# Patient Record
Sex: MALE | Race: WHITE | Employment: UNEMPLOYED | URBAN - METROPOLITAN AREA
[De-identification: names, ages, dates, MRNs, and addresses within clinical notes are randomized per-mention and may not be internally consistent; named-entity substitution may affect disease eponyms.]

---

## 2020-05-09 ENCOUNTER — ANESTHESIA (OUTPATIENT)
Dept: SURGERY | Age: 55
End: 2020-05-09
Payer: COMMERCIAL

## 2020-05-09 ENCOUNTER — HOSPITAL ENCOUNTER (OUTPATIENT)
Age: 55
Setting detail: OBSERVATION
Discharge: HOME | End: 2020-05-10
Attending: EMERGENCY MEDICINE | Admitting: COLON & RECTAL SURGERY
Payer: COMMERCIAL

## 2020-05-09 ENCOUNTER — APPOINTMENT (OUTPATIENT)
Dept: CT IMAGING | Age: 55
End: 2020-05-09
Attending: EMERGENCY MEDICINE
Payer: COMMERCIAL

## 2020-05-09 ENCOUNTER — ANESTHESIA EVENT (OUTPATIENT)
Dept: SURGERY | Age: 55
End: 2020-05-09
Payer: COMMERCIAL

## 2020-05-09 DIAGNOSIS — K61.1 PERIRECTAL ABSCESS: Primary | ICD-10-CM

## 2020-05-09 DIAGNOSIS — D72.829 LEUKOCYTOSIS, UNSPECIFIED TYPE: ICD-10-CM

## 2020-05-09 LAB
ALBUMIN SERPL-MCNC: 3.6 G/DL (ref 3.5–5)
ALBUMIN/GLOB SERPL: 0.9 {RATIO} (ref 1.1–2.2)
ALP SERPL-CCNC: 57 U/L (ref 45–117)
ALT SERPL-CCNC: 52 U/L (ref 12–78)
ANION GAP SERPL CALC-SCNC: 6 MMOL/L (ref 5–15)
APPEARANCE UR: CLEAR
AST SERPL-CCNC: 24 U/L (ref 15–37)
BACTERIA URNS QL MICRO: NEGATIVE /HPF
BASOPHILS # BLD: 0 K/UL (ref 0–0.1)
BASOPHILS NFR BLD: 0 % (ref 0–1)
BILIRUB SERPL-MCNC: 1 MG/DL (ref 0.2–1)
BILIRUB UR QL: NEGATIVE
BUN SERPL-MCNC: 14 MG/DL (ref 6–20)
BUN/CREAT SERPL: 13 (ref 12–20)
CALCIUM SERPL-MCNC: 9.4 MG/DL (ref 8.5–10.1)
CHLORIDE SERPL-SCNC: 103 MMOL/L (ref 97–108)
CO2 SERPL-SCNC: 29 MMOL/L (ref 21–32)
COLOR UR: NORMAL
CREAT SERPL-MCNC: 1.07 MG/DL (ref 0.7–1.3)
DIFFERENTIAL METHOD BLD: ABNORMAL
EOSINOPHIL # BLD: 0.1 K/UL (ref 0–0.4)
EOSINOPHIL NFR BLD: 1 % (ref 0–7)
EPITH CASTS URNS QL MICRO: NORMAL /LPF
ERYTHROCYTE [DISTWIDTH] IN BLOOD BY AUTOMATED COUNT: 11.9 % (ref 11.5–14.5)
GLOBULIN SER CALC-MCNC: 3.9 G/DL (ref 2–4)
GLUCOSE SERPL-MCNC: 135 MG/DL (ref 65–100)
GLUCOSE UR STRIP.AUTO-MCNC: NEGATIVE MG/DL
HCT VFR BLD AUTO: 47.1 % (ref 36.6–50.3)
HGB BLD-MCNC: 16.4 G/DL (ref 12.1–17)
HGB UR QL STRIP: NEGATIVE
HYALINE CASTS URNS QL MICRO: NORMAL /LPF (ref 0–5)
IMM GRANULOCYTES # BLD AUTO: 0 K/UL (ref 0–0.04)
IMM GRANULOCYTES NFR BLD AUTO: 0 % (ref 0–0.5)
KETONES UR QL STRIP.AUTO: NEGATIVE MG/DL
LEUKOCYTE ESTERASE UR QL STRIP.AUTO: NEGATIVE
LYMPHOCYTES # BLD: 2.8 K/UL (ref 0.8–3.5)
LYMPHOCYTES NFR BLD: 24 % (ref 12–49)
MCH RBC QN AUTO: 30 PG (ref 26–34)
MCHC RBC AUTO-ENTMCNC: 34.8 G/DL (ref 30–36.5)
MCV RBC AUTO: 86.1 FL (ref 80–99)
MONOCYTES # BLD: 0.9 K/UL (ref 0–1)
MONOCYTES NFR BLD: 8 % (ref 5–13)
NEUTS SEG # BLD: 7.7 K/UL (ref 1.8–8)
NEUTS SEG NFR BLD: 67 % (ref 32–75)
NITRITE UR QL STRIP.AUTO: NEGATIVE
NRBC # BLD: 0 K/UL (ref 0–0.01)
NRBC BLD-RTO: 0 PER 100 WBC
PH UR STRIP: 6 [PH] (ref 5–8)
PLATELET # BLD AUTO: 201 K/UL (ref 150–400)
PMV BLD AUTO: 9.8 FL (ref 8.9–12.9)
POTASSIUM SERPL-SCNC: 3.9 MMOL/L (ref 3.5–5.1)
PROT SERPL-MCNC: 7.5 G/DL (ref 6.4–8.2)
PROT UR STRIP-MCNC: NEGATIVE MG/DL
RBC # BLD AUTO: 5.47 M/UL (ref 4.1–5.7)
RBC #/AREA URNS HPF: NORMAL /HPF (ref 0–5)
SODIUM SERPL-SCNC: 138 MMOL/L (ref 136–145)
SP GR UR REFRACTOMETRY: 1.01 (ref 1–1.03)
UA: UC IF INDICATED,UAUC: NORMAL
UROBILINOGEN UR QL STRIP.AUTO: 0.2 EU/DL (ref 0.2–1)
WBC # BLD AUTO: 11.5 K/UL (ref 4.1–11.1)
WBC URNS QL MICRO: NORMAL /HPF (ref 0–4)

## 2020-05-09 PROCEDURE — 74011000250 HC RX REV CODE- 250: Performed by: COLON & RECTAL SURGERY

## 2020-05-09 PROCEDURE — 77030040361 HC SLV COMPR DVT MDII -B: Performed by: COLON & RECTAL SURGERY

## 2020-05-09 PROCEDURE — 99284 EMERGENCY DEPT VISIT MOD MDM: CPT

## 2020-05-09 PROCEDURE — 76060000032 HC ANESTHESIA 0.5 TO 1 HR: Performed by: COLON & RECTAL SURGERY

## 2020-05-09 PROCEDURE — 99218 HC RM OBSERVATION: CPT

## 2020-05-09 PROCEDURE — 81001 URINALYSIS AUTO W/SCOPE: CPT

## 2020-05-09 PROCEDURE — 80053 COMPREHEN METABOLIC PANEL: CPT

## 2020-05-09 PROCEDURE — 96365 THER/PROPH/DIAG IV INF INIT: CPT

## 2020-05-09 PROCEDURE — 77030002888 HC SUT CHRMC J&J -A: Performed by: COLON & RECTAL SURGERY

## 2020-05-09 PROCEDURE — 74011250637 HC RX REV CODE- 250/637: Performed by: COLON & RECTAL SURGERY

## 2020-05-09 PROCEDURE — 74011000250 HC RX REV CODE- 250: Performed by: NURSE ANESTHETIST, CERTIFIED REGISTERED

## 2020-05-09 PROCEDURE — 77030008684 HC TU ET CUF COVD -B: Performed by: ANESTHESIOLOGY

## 2020-05-09 PROCEDURE — 76210000006 HC OR PH I REC 0.5 TO 1 HR: Performed by: COLON & RECTAL SURGERY

## 2020-05-09 PROCEDURE — 77030031139 HC SUT VCRL2 J&J -A: Performed by: COLON & RECTAL SURGERY

## 2020-05-09 PROCEDURE — 74011250636 HC RX REV CODE- 250/636: Performed by: NURSE ANESTHETIST, CERTIFIED REGISTERED

## 2020-05-09 PROCEDURE — 36415 COLL VENOUS BLD VENIPUNCTURE: CPT

## 2020-05-09 PROCEDURE — 77030011640 HC PAD GRND REM COVD -A: Performed by: COLON & RECTAL SURGERY

## 2020-05-09 PROCEDURE — 96375 TX/PRO/DX INJ NEW DRUG ADDON: CPT

## 2020-05-09 PROCEDURE — 74177 CT ABD & PELVIS W/CONTRAST: CPT

## 2020-05-09 PROCEDURE — 85025 COMPLETE CBC W/AUTO DIFF WBC: CPT

## 2020-05-09 PROCEDURE — 96376 TX/PRO/DX INJ SAME DRUG ADON: CPT

## 2020-05-09 PROCEDURE — 74011250636 HC RX REV CODE- 250/636: Performed by: EMERGENCY MEDICINE

## 2020-05-09 PROCEDURE — 74011250636 HC RX REV CODE- 250/636: Performed by: ANESTHESIOLOGY

## 2020-05-09 PROCEDURE — 74011636320 HC RX REV CODE- 636/320: Performed by: EMERGENCY MEDICINE

## 2020-05-09 PROCEDURE — 76010000138 HC OR TIME 0.5 TO 1 HR: Performed by: COLON & RECTAL SURGERY

## 2020-05-09 PROCEDURE — 74011250637 HC RX REV CODE- 250/637: Performed by: EMERGENCY MEDICINE

## 2020-05-09 PROCEDURE — 77030026438 HC STYL ET INTUB CARD -A: Performed by: ANESTHESIOLOGY

## 2020-05-09 RX ORDER — ONDANSETRON 2 MG/ML
4 INJECTION INTRAMUSCULAR; INTRAVENOUS
Status: COMPLETED | OUTPATIENT
Start: 2020-05-09 | End: 2020-05-09

## 2020-05-09 RX ORDER — DIPHENHYDRAMINE HYDROCHLORIDE 50 MG/ML
12.5 INJECTION, SOLUTION INTRAMUSCULAR; INTRAVENOUS AS NEEDED
Status: DISCONTINUED | OUTPATIENT
Start: 2020-05-09 | End: 2020-05-09 | Stop reason: HOSPADM

## 2020-05-09 RX ORDER — OXYCODONE HYDROCHLORIDE 5 MG/1
5 TABLET ORAL
Qty: 20 TAB | Refills: 0 | OUTPATIENT
Start: 2020-05-09 | End: 2020-05-12

## 2020-05-09 RX ORDER — SODIUM CHLORIDE 0.9 % (FLUSH) 0.9 %
5-40 SYRINGE (ML) INJECTION AS NEEDED
Status: DISCONTINUED | OUTPATIENT
Start: 2020-05-09 | End: 2020-05-09 | Stop reason: HOSPADM

## 2020-05-09 RX ORDER — IBUPROFEN 800 MG/1
800 TABLET ORAL
COMMUNITY

## 2020-05-09 RX ORDER — MORPHINE SULFATE 2 MG/ML
4 INJECTION, SOLUTION INTRAMUSCULAR; INTRAVENOUS ONCE
Status: COMPLETED | OUTPATIENT
Start: 2020-05-09 | End: 2020-05-09

## 2020-05-09 RX ORDER — METRONIDAZOLE 500 MG/100ML
500 INJECTION, SOLUTION INTRAVENOUS
Status: COMPLETED | OUTPATIENT
Start: 2020-05-09 | End: 2020-05-09

## 2020-05-09 RX ORDER — NALOXONE HYDROCHLORIDE 0.4 MG/ML
INJECTION, SOLUTION INTRAMUSCULAR; INTRAVENOUS; SUBCUTANEOUS AS NEEDED
Status: DISCONTINUED | OUTPATIENT
Start: 2020-05-09 | End: 2020-05-09 | Stop reason: HOSPADM

## 2020-05-09 RX ORDER — SODIUM CHLORIDE 0.9 % (FLUSH) 0.9 %
5-40 SYRINGE (ML) INJECTION AS NEEDED
Status: DISCONTINUED | OUTPATIENT
Start: 2020-05-09 | End: 2020-05-10 | Stop reason: HOSPADM

## 2020-05-09 RX ORDER — SODIUM CHLORIDE 0.9 % (FLUSH) 0.9 %
10 SYRINGE (ML) INJECTION
Status: COMPLETED | OUTPATIENT
Start: 2020-05-09 | End: 2020-05-09

## 2020-05-09 RX ORDER — MIDAZOLAM HYDROCHLORIDE 1 MG/ML
INJECTION, SOLUTION INTRAMUSCULAR; INTRAVENOUS AS NEEDED
Status: DISCONTINUED | OUTPATIENT
Start: 2020-05-09 | End: 2020-05-09 | Stop reason: HOSPADM

## 2020-05-09 RX ORDER — SODIUM CHLORIDE, SODIUM LACTATE, POTASSIUM CHLORIDE, CALCIUM CHLORIDE 600; 310; 30; 20 MG/100ML; MG/100ML; MG/100ML; MG/100ML
25 INJECTION, SOLUTION INTRAVENOUS CONTINUOUS
Status: DISCONTINUED | OUTPATIENT
Start: 2020-05-09 | End: 2020-05-09 | Stop reason: HOSPADM

## 2020-05-09 RX ORDER — HYDROMORPHONE HYDROCHLORIDE 1 MG/ML
0.2 INJECTION, SOLUTION INTRAMUSCULAR; INTRAVENOUS; SUBCUTANEOUS
Status: DISCONTINUED | OUTPATIENT
Start: 2020-05-09 | End: 2020-05-09 | Stop reason: HOSPADM

## 2020-05-09 RX ORDER — SUCCINYLCHOLINE CHLORIDE 20 MG/ML
INJECTION INTRAMUSCULAR; INTRAVENOUS AS NEEDED
Status: DISCONTINUED | OUTPATIENT
Start: 2020-05-09 | End: 2020-05-09 | Stop reason: HOSPADM

## 2020-05-09 RX ORDER — FENTANYL CITRATE 50 UG/ML
25 INJECTION, SOLUTION INTRAMUSCULAR; INTRAVENOUS
Status: DISCONTINUED | OUTPATIENT
Start: 2020-05-09 | End: 2020-05-09 | Stop reason: HOSPADM

## 2020-05-09 RX ORDER — SODIUM CHLORIDE 0.9 % (FLUSH) 0.9 %
5-40 SYRINGE (ML) INJECTION EVERY 8 HOURS
Status: DISCONTINUED | OUTPATIENT
Start: 2020-05-09 | End: 2020-05-09 | Stop reason: HOSPADM

## 2020-05-09 RX ORDER — OXYCODONE HYDROCHLORIDE 5 MG/1
5 TABLET ORAL
Qty: 20 TAB | Refills: 0 | Status: SHIPPED | OUTPATIENT
Start: 2020-05-09 | End: 2020-05-12

## 2020-05-09 RX ORDER — PROPOFOL 10 MG/ML
INJECTION, EMULSION INTRAVENOUS AS NEEDED
Status: DISCONTINUED | OUTPATIENT
Start: 2020-05-09 | End: 2020-05-09 | Stop reason: HOSPADM

## 2020-05-09 RX ORDER — DEXAMETHASONE SODIUM PHOSPHATE 100 MG/10ML
INJECTION INTRAMUSCULAR; INTRAVENOUS AS NEEDED
Status: DISCONTINUED | OUTPATIENT
Start: 2020-05-09 | End: 2020-05-09 | Stop reason: HOSPADM

## 2020-05-09 RX ORDER — SODIUM CHLORIDE, SODIUM LACTATE, POTASSIUM CHLORIDE, CALCIUM CHLORIDE 600; 310; 30; 20 MG/100ML; MG/100ML; MG/100ML; MG/100ML
INJECTION, SOLUTION INTRAVENOUS
Status: DISCONTINUED | OUTPATIENT
Start: 2020-05-09 | End: 2020-05-09 | Stop reason: HOSPADM

## 2020-05-09 RX ORDER — OXYCODONE HYDROCHLORIDE 5 MG/1
5 TABLET ORAL
Status: DISCONTINUED | OUTPATIENT
Start: 2020-05-09 | End: 2020-05-10 | Stop reason: HOSPADM

## 2020-05-09 RX ORDER — BUPIVACAINE HYDROCHLORIDE 2.5 MG/ML
INJECTION, SOLUTION EPIDURAL; INFILTRATION; INTRACAUDAL AS NEEDED
Status: DISCONTINUED | OUTPATIENT
Start: 2020-05-09 | End: 2020-05-09 | Stop reason: HOSPADM

## 2020-05-09 RX ORDER — ONDANSETRON 2 MG/ML
INJECTION INTRAMUSCULAR; INTRAVENOUS AS NEEDED
Status: DISCONTINUED | OUTPATIENT
Start: 2020-05-09 | End: 2020-05-09 | Stop reason: HOSPADM

## 2020-05-09 RX ORDER — ROCURONIUM BROMIDE 10 MG/ML
INJECTION, SOLUTION INTRAVENOUS AS NEEDED
Status: DISCONTINUED | OUTPATIENT
Start: 2020-05-09 | End: 2020-05-09 | Stop reason: HOSPADM

## 2020-05-09 RX ORDER — FENTANYL CITRATE 50 UG/ML
INJECTION, SOLUTION INTRAMUSCULAR; INTRAVENOUS AS NEEDED
Status: DISCONTINUED | OUTPATIENT
Start: 2020-05-09 | End: 2020-05-09 | Stop reason: HOSPADM

## 2020-05-09 RX ORDER — LIDOCAINE HYDROCHLORIDE 20 MG/ML
INJECTION, SOLUTION EPIDURAL; INFILTRATION; INTRACAUDAL; PERINEURAL AS NEEDED
Status: DISCONTINUED | OUTPATIENT
Start: 2020-05-09 | End: 2020-05-09 | Stop reason: HOSPADM

## 2020-05-09 RX ORDER — CIPROFLOXACIN 500 MG/1
500 TABLET ORAL
Status: COMPLETED | OUTPATIENT
Start: 2020-05-09 | End: 2020-05-09

## 2020-05-09 RX ORDER — SODIUM CHLORIDE 0.9 % (FLUSH) 0.9 %
5-40 SYRINGE (ML) INJECTION EVERY 8 HOURS
Status: DISCONTINUED | OUTPATIENT
Start: 2020-05-09 | End: 2020-05-10 | Stop reason: HOSPADM

## 2020-05-09 RX ORDER — DEXMEDETOMIDINE HYDROCHLORIDE 100 UG/ML
INJECTION, SOLUTION INTRAVENOUS AS NEEDED
Status: DISCONTINUED | OUTPATIENT
Start: 2020-05-09 | End: 2020-05-09 | Stop reason: HOSPADM

## 2020-05-09 RX ORDER — LIDOCAINE HYDROCHLORIDE 10 MG/ML
0.1 INJECTION, SOLUTION EPIDURAL; INFILTRATION; INTRACAUDAL; PERINEURAL AS NEEDED
Status: DISCONTINUED | OUTPATIENT
Start: 2020-05-09 | End: 2020-05-09 | Stop reason: HOSPADM

## 2020-05-09 RX ADMIN — FENTANYL CITRATE 100 MCG: 50 INJECTION INTRAMUSCULAR; INTRAVENOUS at 19:35

## 2020-05-09 RX ADMIN — MORPHINE SULFATE 4 MG: 2 INJECTION, SOLUTION INTRAMUSCULAR; INTRAVENOUS at 15:09

## 2020-05-09 RX ADMIN — NALXONE HYDROCHLORIDE 0.08 MG: 0.4 INJECTION INTRAMUSCULAR; INTRAVENOUS; SUBCUTANEOUS at 20:32

## 2020-05-09 RX ADMIN — OXYCODONE 5 MG: 5 TABLET ORAL at 23:34

## 2020-05-09 RX ADMIN — ONDANSETRON 4 MG: 2 INJECTION INTRAMUSCULAR; INTRAVENOUS at 15:07

## 2020-05-09 RX ADMIN — LIDOCAINE HYDROCHLORIDE 40 MG: 20 INJECTION, SOLUTION EPIDURAL; INFILTRATION; INTRACAUDAL; PERINEURAL at 19:37

## 2020-05-09 RX ADMIN — PROPOFOL 200 MG: 10 INJECTION, EMULSION INTRAVENOUS at 19:38

## 2020-05-09 RX ADMIN — FENTANYL CITRATE 100 MCG: 50 INJECTION INTRAMUSCULAR; INTRAVENOUS at 20:24

## 2020-05-09 RX ADMIN — ROCURONIUM BROMIDE 10 MG: 10 INJECTION INTRAVENOUS at 19:35

## 2020-05-09 RX ADMIN — Medication 10 ML: at 23:34

## 2020-05-09 RX ADMIN — Medication 10 ML: at 15:01

## 2020-05-09 RX ADMIN — SUCCINYLCHOLINE CHLORIDE 180 MG: 20 INJECTION, SOLUTION INTRAMUSCULAR; INTRAVENOUS at 19:38

## 2020-05-09 RX ADMIN — DEXAMETHASONE SODIUM PHOSPHATE 4 MG: 10 INJECTION INTRAMUSCULAR; INTRAVENOUS at 19:38

## 2020-05-09 RX ADMIN — PHENYLEPHRINE HYDROCHLORIDE 80 MCG: 10 INJECTION INTRAVENOUS at 19:54

## 2020-05-09 RX ADMIN — FENTANYL CITRATE 50 MCG: 50 INJECTION INTRAMUSCULAR; INTRAVENOUS at 20:07

## 2020-05-09 RX ADMIN — PHENYLEPHRINE HYDROCHLORIDE 80 MCG: 10 INJECTION INTRAVENOUS at 20:02

## 2020-05-09 RX ADMIN — PHENYLEPHRINE HYDROCHLORIDE 80 MCG: 10 INJECTION INTRAVENOUS at 19:52

## 2020-05-09 RX ADMIN — ONDANSETRON HYDROCHLORIDE 4 MG: 2 INJECTION, SOLUTION INTRAMUSCULAR; INTRAVENOUS at 19:38

## 2020-05-09 RX ADMIN — PHENYLEPHRINE HYDROCHLORIDE 80 MCG: 10 INJECTION INTRAVENOUS at 19:49

## 2020-05-09 RX ADMIN — MEPERIDINE HYDROCHLORIDE 12.5 MG: 25 INJECTION INTRAMUSCULAR; INTRAVENOUS; SUBCUTANEOUS at 20:52

## 2020-05-09 RX ADMIN — CIPROFLOXACIN HYDROCHLORIDE 500 MG: 500 TABLET, FILM COATED ORAL at 16:22

## 2020-05-09 RX ADMIN — MEPERIDINE HYDROCHLORIDE 12.5 MG: 25 INJECTION INTRAMUSCULAR; INTRAVENOUS; SUBCUTANEOUS at 20:46

## 2020-05-09 RX ADMIN — METRONIDAZOLE 500 MG: 500 INJECTION, SOLUTION INTRAVENOUS at 16:23

## 2020-05-09 RX ADMIN — DEXMEDETOMIDINE HYDROCHLORIDE 10 MCG: 100 INJECTION, SOLUTION, CONCENTRATE INTRAVENOUS at 19:35

## 2020-05-09 RX ADMIN — MORPHINE SULFATE 4 MG: 2 INJECTION, SOLUTION INTRAMUSCULAR; INTRAVENOUS at 16:51

## 2020-05-09 RX ADMIN — IOPAMIDOL 100 ML: 755 INJECTION, SOLUTION INTRAVENOUS at 15:01

## 2020-05-09 RX ADMIN — MIDAZOLAM HYDROCHLORIDE 2 MG: 1 INJECTION, SOLUTION INTRAMUSCULAR; INTRAVENOUS at 19:28

## 2020-05-09 RX ADMIN — SODIUM CHLORIDE, POTASSIUM CHLORIDE, SODIUM LACTATE AND CALCIUM CHLORIDE: 600; 310; 30; 20 INJECTION, SOLUTION INTRAVENOUS at 19:25

## 2020-05-09 NOTE — DISCHARGE INSTRUCTIONS
Kaylie Morris MD, FACS  Yvon Her MD, FACS  Alejandro Wadsworth. Olu Daniel MD, [address] [facility] Hari Schmitz MD, [address] [facility] Carlos Villarreal MD, FACS  Carolina Borden. MD Vasiliy Garsia MD Lendel Comber, MD    [facility] Discharge Instructions for Ambulatory 23-Hour Surgery Patients    1. Advance to high fiber diet as tolerated. 2. Take Metamucil/Citrucel 1 teaspoon mixed in a glass of water in AM and PM.  3. Remove dressing at 10 PM.  4. Take 1 sitz baths today (warm water baths for 15-20 minutes). Begin four (4) times a day the day after surgery. 5. Apply Nupercainal Ointment (does not need a prescription) to the anal area after sitz baths, place a dry 4x4 gauze over the are between the buttocks. 6. Take pain medication as prescribed. (NO DRIVING WHILE ON PAIN MEDICATION). 7. No lifting any objects weighing more than 15 pounds. 8. No sitting more than 45 minutes without standing, walking, or lying down. 9. You may walk as desired. 10.  Please schedule an appointment in the office within 10-14 days. Call ahead to schedule your appointment [phone]. 11.  Call [facility] [phone] if you have any problems or questions after   hours. 12.  Expect slight bright red blood up to four (4) weeks from surgery. 13.  Stitches are the dissolving type - they do not need removal in the office. 14.  If no bowel movement by 5/12/20, take 30cc (1 tablespoon) of Milk of Magnesia. Repeat if no results. If still no bowel movement the next day, then call the office.

## 2020-05-09 NOTE — ANESTHESIA PREPROCEDURE EVALUATION
Anesthetic History   No history of anesthetic complications            Review of Systems / Medical History  Patient summary reviewed, nursing notes reviewed and pertinent labs reviewed    Pulmonary          Smoker         Neuro/Psych         Psychiatric history (Bipolar D/O)    Comments: ADHD  Bipolar II Disorder  Depression  Sciatica, right Cardiovascular  Within defined limits                Exercise tolerance: >4 METS     GI/Hepatic/Renal           Liver disease (fatty liver; pt unaware)    Comments: Fatty Liver Endo/Other        Morbid obesity     Other Findings   Comments: Eloise-rectal abscess           Physical Exam    Airway  Mallampati: II  TM Distance: > 6 cm  Neck ROM: normal range of motion   Mouth opening: Normal    Comments: beard Cardiovascular    Rhythm: regular  Rate: normal      Pertinent negatives: No murmur   Dental    Dentition: Poor dentition  Comments: Multiple missing teeth, none loose.    Pulmonary  Breath sounds clear to auscultation               Abdominal  GI exam deferred       Other Findings            Anesthetic Plan    ASA: 2  Anesthesia type: general    Monitoring Plan: BIS      Induction: Intravenous and RSI  Anesthetic plan and risks discussed with: Patient

## 2020-05-09 NOTE — ED PROVIDER NOTES
EMERGENCY DEPARTMENT HISTORY AND PHYSICAL EXAM      Date: 5/9/2020  Patient Name: Simuel Gosselin    History of Presenting Illness     Chief Complaint   Patient presents with    Anal Pain     worsening rectal anal pain for two days; [facility]       History Provided By: Patient    HPI: Simuel Gosselin, 49 y.o. male presents to the ED with cc of rectal pain. Symptoms have been present for the past 2 to 3 days. Patient states that symptoms  have progressively worsened since onset. He denies any fevers or chills, vomiting. He does endorse abdominal pain located to suprapubic region. Denies any associated urinary symptoms. Symptoms are primarily located to rectum and he has been unable to have bowel movement since onset, he has had some liquid leakage from his rectum. He has not had any prior illnesses related to this. [facility] and found to have white count of 13,000 and sent to the ED for further evaluation. There are no other complaints, changes, or physical findings at this time. PCP: Jose Metz MD    No current facility-administered medications on file prior to encounter. Current Outpatient Medications on File Prior to Encounter   Medication Sig Dispense Refill    ibuprofen (MOTRIN) 800 mg tablet Take 800 mg by mouth every six (6) hours as needed for Pain.  clonazePAM (KLONOPIN) 0.5 mg tablet Take  by mouth as needed.  dextroamphetamine-amphetamine (ADDERALL) 20 mg tablet Take 20 mg by mouth three (3) times daily.  oxyCODONE-acetaminophen (PERCOCET) 5-325 mg per tablet Take 1 Tab by mouth every four (4) hours as needed for Pain. Max Daily Amount: 6 Tabs.  25 Tab 0       Past History     Past Medical History:  Past Medical History:   Diagnosis Date    ADHD (attention deficit hyperactivity disorder)     Bipolar 2 disorder ([facility] [address]     Fatty liver     as of 5/27/15; pt states he is unaware he had fatty    Sciatica        Past Surgical History:  Past Surgical History:   Procedure Laterality Date    HX APPENDECTOMY  age 17   [de-identified] KNEE ARTHROSCOPY Left        Family History:  History reviewed. No pertinent family history. Social History:  Social History     Tobacco Use    Smoking status: Current Some Day Smoker     Last attempt to quit: 2014     Years since quittin.2   Substance Use Topics    Alcohol use: Yes     Comment: not weekly    Drug use: No       Allergies: Allergies   Allergen Reactions    Pcn [Penicillins] Hives         Review of Systems   Review of Systems   Constitutional: Negative for chills and fever. HENT: Negative. Eyes: Negative for visual disturbance. Respiratory: Negative for cough and shortness of breath. Cardiovascular: Negative for chest pain and leg swelling. Gastrointestinal: Positive for abdominal pain, diarrhea and nausea. Negative for anal bleeding, blood in stool, constipation and vomiting. Genitourinary: Negative. Musculoskeletal: Negative for back pain and gait problem. Skin: Negative for color change and rash. Neurological: Negative for dizziness, weakness, light-headedness and headaches. Hematological: Does not bruise/bleed easily. All other systems reviewed and are negative. Physical Exam   Physical Exam  Vitals signs and nursing note reviewed. Constitutional:       General: He is in acute distress ( Appears to be in moderate distress due to pain. ). Appearance: Normal appearance. He is obese. He is not ill-appearing or toxic-appearing. HENT:      Head: Normocephalic and atraumatic. Nose: Nose normal.      Mouth/Throat:      Mouth: Mucous membranes are moist.   Eyes:      Extraocular Movements: Extraocular movements intact. Pupils: Pupils are equal, round, and reactive to light. Neck:      Musculoskeletal: Normal range of motion and neck supple. Cardiovascular:      Rate and Rhythm: Normal rate and regular rhythm. Heart sounds:  No murmur. Pulmonary:      Effort: Pulmonary effort is normal. No respiratory distress. Breath sounds: Normal breath sounds. No wheezing. Abdominal:      General: There is no distension. Palpations: Abdomen is soft. Tenderness: There is abdominal tenderness ( Mild TTP located to suprapubic region without guarding or rebound). There is no guarding or rebound. Genitourinary:     Comments: External examination demonstrates external hemorrhoids which are nonthrombosed. There is no obvious fluctuance or mass noted. Patient unable to tolerate digital rectal examination due to severe pain which significantly limits my examination, however I do not appreciate any obvious mass on my limited SILVIA  Musculoskeletal: Normal range of motion. General: No swelling or tenderness. Right lower leg: No edema. Left lower leg: No edema. Skin:     General: Skin is warm and dry. Coloration: Skin is not pale. Findings: No erythema. Neurological:      General: No focal deficit present. Mental Status: He is alert and oriented to person, place, and time. Diagnostic Study Results     Labs -     Recent Results (from the past 12 hour(s))   CBC WITH AUTOMATED DIFF    Collection Time: 05/09/20  2:35 PM   Result Value Ref Range    WBC 11.5 (H) 4.1 - 11.1 K/uL    RBC 5.47 4.10 - 5.70 M/uL    HGB 16.4 12.1 - 17.0 g/dL    HCT 47.1 36.6 - 50.3 %    MCV 86.1 80.0 - 99.0 FL    MCH 30.0 26.0 - 34.0 PG    MCHC 34.8 30.0 - 36.5 g/dL    RDW 11.9 11.5 - 14.5 %    PLATELET 289 966 - 068 K/uL    MPV 9.8 8.9 - 12.9 FL    NRBC 0.0 0  WBC    ABSOLUTE NRBC 0.00 0.00 - 0.01 K/uL    NEUTROPHILS 67 32 - 75 %    LYMPHOCYTES 24 12 - 49 %    MONOCYTES 8 5 - 13 %    EOSINOPHILS 1 0 - 7 %    BASOPHILS 0 0 - 1 %    IMMATURE GRANULOCYTES 0 0.0 - 0.5 %    ABS. NEUTROPHILS 7.7 1.8 - 8.0 K/UL    ABS. LYMPHOCYTES 2.8 0.8 - 3.5 K/UL    ABS. MONOCYTES 0.9 0.0 - 1.0 K/UL    ABS.  EOSINOPHILS 0.1 0.0 - 0.4 K/UL ABS. BASOPHILS 0.0 0.0 - 0.1 K/UL    ABS. IMM. GRANS. 0.0 0.00 - 0.04 K/UL    DF AUTOMATED     METABOLIC PANEL, COMPREHENSIVE    Collection Time: 05/09/20  2:35 PM   Result Value Ref Range    Sodium 138 136 - 145 mmol/L    Potassium 3.9 3.5 - 5.1 mmol/L    Chloride 103 97 - 108 mmol/L    CO2 29 21 - 32 mmol/L    Anion gap 6 5 - 15 mmol/L    Glucose 135 (H) 65 - 100 mg/dL    BUN 14 6 - 20 MG/DL    Creatinine 1.07 0.70 - 1.30 MG/DL    BUN/Creatinine ratio 13 12 - 20      GFR est AA >60 >60 ml/min/1.73m2    GFR est non-AA >60 >60 ml/min/1.73m2    Calcium 9.4 8.5 - 10.1 MG/DL    Bilirubin, total 1.0 0.2 - 1.0 MG/DL    ALT (SGPT) 52 12 - 78 U/L    AST (SGOT) 24 15 - 37 U/L    Alk. phosphatase 57 45 - 117 U/L    Protein, total 7.5 6.4 - 8.2 g/dL    Albumin 3.6 3.5 - 5.0 g/dL    Globulin 3.9 2.0 - 4.0 g/dL    A-G Ratio 0.9 (L) 1.1 - 2.2     URINALYSIS W/ REFLEX CULTURE    Collection Time: 05/09/20  3:36 PM   Result Value Ref Range    Color YELLOW/STRAW      Appearance CLEAR CLEAR      Specific gravity 1.010 1.003 - 1.030      pH (UA) 6.0 5.0 - 8.0      Protein Negative NEG mg/dL    Glucose Negative NEG mg/dL    Ketone Negative NEG mg/dL    Bilirubin Negative NEG      Blood Negative NEG      Urobilinogen 0.2 0.2 - 1.0 EU/dL    Nitrites Negative NEG      Leukocyte Esterase Negative NEG      UA:UC IF INDICATED CULTURE NOT INDICATED BY UA RESULT CNI      WBC 0-4 0 - 4 /hpf    RBC 0-5 0 - 5 /hpf    Epithelial cells FEW FEW /lpf    Bacteria Negative NEG /hpf    Hyaline cast 0-2 0 - 5 /lpf       Radiologic Studies -   CT ABD PELV W CONT   Final Result   IMPRESSION: Crescentic shaped fluid collection anterior and lateral to the anus,   measuring 1.7 cm x 3.2 cm in greatest dimensions. Very mild asymmetric stranding   within the fat of the inferior medial right buttock just lateral to the anus.          CT Results  (Last 48 hours)               05/09/20 1502  CT ABD PELV W CONT Final result    Impression:  IMPRESSION: Crescentic shaped fluid collection anterior and lateral to the anus,   measuring 1.7 cm x 3.2 cm in greatest dimensions. Very mild asymmetric stranding   within the fat of the inferior medial right buttock just lateral to the anus. Narrative:  INDICATION: Severe rectal pain with suprapubic pain and leukocytosis, concern   for proctitis, perirectal abscess. CT of the abdomen and pelvis is performed with 5 mm collimation. Study is   performed with 100 cc of nonionic Isovue 370. Sagittal and coronal reformatted   images were also performed. CT dose reduction was achieved with the use of the standardized protocol   tailored for this examination and automatic exposure control for dose   modulation. Direct comparison is made to prior CT dated July 2016. Findings:       Lung bases: The visualized lung bases are clear. Liver: There is diffuse fatty infiltration of the liver. Adrenals: Adrenal glands are normal.       Pancreas: The pancreas is normal.       Gallbladder: The gallbladder is normal.       Kidneys: The kidneys are normal.       Spleen: The spleen is normal.       Lymph nodes. There is no ganesh hepatitis, mesenteric, retroperitoneal or pelvic   lymphadenopathy. Bowel: No thickened or dilated loop of large or small bowel is visualized. Appendix: The appendix is surgically absent. Urinary bladder: Urinary bladder is partially filled and grossly normal.       Miscellaneous: There is a crescentic shaped fluid collection anterior and   lateral to the anus, measuring 1.7 cm x 3.2 cm in greatest dimensions. There is   very mild asymmetric stranding within the fat of the inferior medial right   buttock just lateral to the anus. There is no free intraperitoneal fluid or gas. CXR Results  (Last 48 hours)    None          Medical Decision Making   I am the first provider for this patient.     I reviewed the vital signs, available nursing notes, past medical history, past surgical history, family history and social history. Vital Signs-Reviewed the patient's vital signs. Patient Vitals for the past 12 hrs:   Temp Pulse Resp BP SpO2   05/09/20 2243 98.4 °F (36.9 °C) 95 18 124/66 97 %   05/09/20 2120 99.5 °F (37.5 °C) 100 18 137/74 95 %   05/09/20 2100  99 17 150/73 97 %   05/09/20 2058  (!) 103 18 154/79 100 %   05/09/20 2055 99.6 °F (37.6 °C) (!) 104 18 166/73 100 %   05/09/20 2050  (!) 106 22 (!) 169/94 92 %   05/09/20 2045  (!) 113 20 171/69 (!) 77 %   05/09/20 2040  (!) 109 24  100 %   05/09/20 2036 98.1 °F (36.7 °C) 95 14 141/83 99 %   05/09/20 2035  84 14  98 %   05/09/20 2030  82 15 124/74 95 %   05/09/20 2027 98.1 °F (36.7 °C) 95 10 141/83 (!) 86 %   05/09/20 1830    100/58 94 %   05/09/20 1800    129/85 96 %   05/09/20 1700    139/71 95 %   05/09/20 1600    115/66 94 %   05/09/20 1322 97.8 °F (36.6 °C) 95 20 129/80 98 %       Records Reviewed: Nursing Notes    Provider Notes (Medical Decision Making):   67-year-old male here with significant rectal pain over the past 2 to 3 days and found to have leukocytosis of 13,000 at outpatient urgent care center and sent to the ED for evaluation. On my examination he is in moderate discomfort. Rectal exam as above. He is afebrile and vital signs stable through entire ED stay. Abdomen is non-peritoneal.  Given his leukocytosis and inability to tolerate digital rectal exam, I did obtain CT imaging out of concern for proctitis versus perirectal abscess and this did demonstrate perianal versus perirectal abscess located anterior laterally to the rectum. I do not appreciate any obvious superficial mass that I would feel comfortable draining at bedside, so I discussed with colorectal surgery, Dr. Kady Means, who will perform incision and drainage under general anesthesia. Patient will be treated with Cipro and Flagyl pending OR. ED Course:   Initial assessment performed.  The patients presenting problems have been discussed, and they are in agreement with the care plan formulated and outlined with them. I have encouraged them to ask questions as they arise throughout their visit. Admission Note:  Patient is being admitted to the hospital by Dr. Omega Stuart, Service: Colorectal Surgery. The results of their tests and reasons for their admission have been discussed with them and available family. They convey agreement and understanding for the need to be admitted and for their admission diagnosis. Disposition:  Admit colorectal surgery after OR    Diagnosis     Clinical Impression:   1. Perirectal abscess    2. Leukocytosis, unspecified type        Attestations:    Shivam Moran MD    Please note that this dictation was completed with [employer], the computer voice recognition software. Quite often unanticipated grammatical, syntax, homophones, and other interpretive errors are inadvertently transcribed by the computer software. Please disregard these errors. Please excuse any errors that have escaped final proofreading. Thank you.

## 2020-05-09 NOTE — ED NOTES
Report given to Eb Anne RN. All questions welcomed and answered. Relinquished care of pt at this time.

## 2020-05-09 NOTE — H&P
[facility] and P      Date of Admission:2020    Subjective:     Chief Complaint  perirectal abscess    History of Present Illness  Pt with perianal pain over the last few days - pain has gotten progressively worse. Slight leukocytosis in the ER - CT scan shows perirectal abscess    Past Medical History:   Diagnosis Date    ADHD (attention deficit hyperactivity disorder)     Bipolar 2 disorder ([facility] [address]     Fatty liver     as of 5/27/15; pt states he is unaware he had fatty    Sciatica      Past Surgical History:   Procedure Laterality Date    HX APPENDECTOMY  age 17   [de-identified] KNEE ARTHROSCOPY Left      Allergies   Allergen Reactions    Pcn [Penicillins] Hives     History reviewed. No pertinent family history.   Social History     Socioeconomic History    Marital status:      Spouse name: Not on file    Number of children: Not on file    Years of education: Not on file    Highest education level: Not on file   Tobacco Use    Smoking status: Current Some Day Smoker     Last attempt to quit: 2014     Years since quittin.2   Substance and Sexual Activity    Alcohol use: Yes     Comment: not weekly    Drug use: No       Review of Systems    10 point ros negative except as stated above in HPI    Physical Exam   Visit Vitals  /58   Pulse 95   Temp 97.8 °F (36.6 °C)   Resp 20   Ht 6' (1.829 m)   Wt 132.6 kg (292 lb 5.3 oz)   SpO2 94%   BMI 39.65 kg/m²       General Appearance - alert and oriented x 3, in no acute distress  Trachea midline  Lungs - clear to auscultation b/l   Cardiovascular - regular rate and rhythm  Abdomen - soft, ND, NT  Extremities - nml, atraumatic, no edema   Anorectal exam - fullness and pain on palpation anterior anal verge    Radiology Results  Pertinent images reviewed    Assessment/Plan  48 yo M with perirectal abscess - to OR for incision and drainage    Matias Shahid MD  [facility]  [phone]      [place], [address], [address]    [address] [facility] [place], [place], [address]    [address]   [place], [facility]

## 2020-05-10 VITALS
RESPIRATION RATE: 18 BRPM | DIASTOLIC BLOOD PRESSURE: 56 MMHG | HEART RATE: 73 BPM | OXYGEN SATURATION: 96 % | SYSTOLIC BLOOD PRESSURE: 100 MMHG | WEIGHT: 292.33 LBS | TEMPERATURE: 98.4 F | HEIGHT: 72 IN | BODY MASS INDEX: 39.6 KG/M2

## 2020-05-10 PROCEDURE — 74011250637 HC RX REV CODE- 250/637: Performed by: COLON & RECTAL SURGERY

## 2020-05-10 PROCEDURE — 99218 HC RM OBSERVATION: CPT

## 2020-05-10 RX ADMIN — OXYCODONE 5 MG: 5 TABLET ORAL at 04:09

## 2020-05-10 RX ADMIN — Medication 1 AMPULE: at 09:41

## 2020-05-10 RX ADMIN — Medication 10 ML: at 06:47

## 2020-05-10 RX ADMIN — OXYCODONE 5 MG: 5 TABLET ORAL at 09:40

## 2020-05-10 NOTE — PROGRESS NOTES
Pt has discharge orders for today but was also placed NPO at midnight. Dr. Liz Cardona paged to inquire about wound care and diet orders. Per Telephone order given by Dr. Liz Cardona, pt placed on regular diet and physician will round on pt in an hour to assess/provide wound care.

## 2020-05-10 NOTE — BRIEF OP NOTE
Brief Postoperative Note    Patient: Seema Wilson  YOB: 1965  MRN: [id number]    Date of Procedure: 5/9/2020     Pre-Op Diagnosis: reyna-rectal abcess     Post-Op Diagnosis: Same as preoperative diagnosis.       Procedure(s):  PERIRECTAL ABSCESS EXCISION and placement of draining seton    Surgeon(s):  Orquidea Araujo MD    Surgical Assistant: None    Anesthesia: General     Estimated Blood Loss (mL): Minimal    Complications: None    Specimens: * No specimens in log *     Implants: * No implants in log *    Drains: * No LDAs found *    Findings: anterior abscess and associated fistula    Electronically Signed by Gurvinder Cameron MD on 5/9/2020 at 8:14 PM

## 2020-05-10 NOTE — PROGRESS NOTES
Problem: Falls - Risk of  Goal: *Absence of Falls  Description: Document Yonas Sol Fall Risk and appropriate interventions in the flowsheet. Outcome: Progressing Towards Goal  Note: Fall Risk Interventions:            Medication Interventions: Patient to call before getting OOB                   Problem: Patient Education: Go to Patient Education Activity  Goal: Patient/Family Education  Outcome: Progressing Towards Goal     Problem: Pain  Goal: *Control of Pain  Outcome: Progressing Towards Goal     Problem: Patient Education: Go to Patient Education Activity  Goal: Patient/Family Education  Outcome: Progressing Towards Goal     Problem: Patient Education: Go to Patient Education Activity  Goal: Patient/Family Education  Outcome: Progressing Towards Goal     Problem: Impaired Skin Integrity/Pressure Injury Treatment  Goal: *Improvement of Existing Pressure Injury  Outcome: Progressing Towards Goal  Goal: *Prevention of pressure injury  Description: Document Burke Scale and appropriate interventions in the flowsheet. Outcome: Progressing Towards Goal  Note: Pressure Injury Interventions:             Activity Interventions: Chair cushion                               Problem: Patient Education: Go to Patient Education Activity  Goal: Patient/Family Education  Outcome: Progressing Towards Goal

## 2020-05-10 NOTE — ANESTHESIA POSTPROCEDURE EVALUATION
Procedure(s):  PERIRECTAL ABSCESS EXCISION. general    Anesthesia Post Evaluation        Patient location during evaluation: PACU  Note status: Adequate. Level of consciousness: responsive to verbal stimuli and sleepy but conscious  Pain management: satisfactory to patient  Airway patency: patent  Anesthetic complications: no  Cardiovascular status: acceptable  Respiratory status: acceptable  Hydration status: acceptable  Comments: +Post-Anesthesia Evaluation and Assessment    Patient: Juan Valdez MRN: [id number]  SSN: [id number]   YOB: 1965  Age: 49 y.o. Sex: male      Cardiovascular Function/Vital Signs    /73   Pulse (!) 104   Temp 37.6 °C (99.6 °F)   Resp 18   Ht 6' (1.829 m)   Wt 132.6 kg (292 lb 5.3 oz)   SpO2 100%   BMI 39.65 kg/m²     Patient is status post Procedure(s):  PERIRECTAL ABSCESS EXCISION. Nausea/Vomiting: Controlled. Postoperative hydration reviewed and adequate. Pain:  Pain Scale 1: Numeric (0 - 10) (05/09/20 2105)  Pain Intensity 1: 0 (05/09/20 2105)   Managed. Neurological Status:   Neuro (WDL): Within Defined Limits (05/09/20 2105)   At baseline. Mental Status and Level of Consciousness: Arousable. Pulmonary Status:   O2 Device: Room air (05/09/20 2105)   Adequate oxygenation and airway patent. Complications related to anesthesia: None    Post-anesthesia assessment completed. No concerns. Signed By: Rossi Gar MD    5/9/2020  Post anesthesia nausea and vomiting:  controlled      Vitals Value Taken Time   /73 5/9/2020  9:00 PM   Temp 37.6 °C (99.6 °F) 5/9/2020  8:55 PM   Pulse 99 5/9/2020  9:08 PM   Resp 19 5/9/2020  9:08 PM   SpO2 97 % 5/9/2020  9:08 PM   Vitals shown include unvalidated device data.

## 2020-05-10 NOTE — ROUTINE PROCESS
Pain Prescription given. Discharge medications reviewed with patient and appropriate educational materials and side effects teaching were provided. IV removed and catheter intact. Wound care was completed as well as wound care instructions provided by physician during rounds before discharge. Patient escorted to main entrance by unit secretary for discharge. All personal belongings with patient.

## 2020-05-10 NOTE — PROGRESS NOTES
Shift report given to [facility], RN. Patient is post op with no wound care orders but a discharge order written. He has been medicated for pain 2x. IV locked. He initially had an order for regular diet along with his original NPO order. Patient consumed 2 lean cuisine meals, 20 packs of cracker, 6 apple juices, and water. Patient states he is still hungry. He ambulated well to the bathroom and is urinating with no difficulty.     Namita Ward RN

## 2020-05-13 NOTE — OP NOTES
Critical access hospital  OPERATIVE REPORT    Name:  Jaison Avila  MR#:  [id number]  :  1965  ACCOUNT #:  [de-identified]  DATE OF SERVICE:  2020      PREOPERATIVE DIAGNOSIS:  Perirectal abscess. POSTOPERATIVE DIAGNOSIS:  Perirectal abscess. PROCEDURE PERFORMED:  Incision and drainage of perianal abscess and placement of a draining seton at fistula tract. SURGEON:  Piedad Sahni MD    ASSISTANT:  None. ANESTHESIA:  General anesthesia. COMPLICATIONS:  none    SPECIMENS REMOVED:  None. IMPLANTS:  None. ESTIMATED BLOOD LOSS:  5 mL. URINE OUTPUT:  None. FLUIDS:  800 mL of crystalloid. DRAINS:  None. INDICATIONS FOR PROCEDURE:  The patient is a 81-year-old male who presented to the emergency room with 1 week history of perianal pain. On CT scan, he was noted to have anterior anorectal abscess. He was consented to undergo incision and drainage of perianal abscess. The risk of the procedures including bleeding, need for additional procedures were discussed with the patient, he agreed to proceed. FINDINGS:  There was an anterior abscess with an associated transsphincteric fistula tract. A seton was placed through the fistula tract. DESCRIPTION OF PROCEDURE:  The patient was brought to the operating room, placed in the jackknife prone position on the operating room table. A time-out was performed and the side, site, and identity of the patient were verified. The perineum was prepped and draped in standard sterile fashion. The buttocks were tapped apart. A perianal block using 0.25% Marcaine with epinephrine was administered. The patient was noted to have fullness and fluctuance along the anterior anal verge. Cruciate incision was made overlying this area and there was return of approximately 10-15 mL of thick purulent fluid. This cavity was probed and noted to have an internal opening with a transsphincteric fistula tract.   A silk suture was tied to the S probe and pulled through the tract. A blue vessel loop was tied into place in the tract to act as a seton. Both the internal and external openings of the fistula tract were packed with gauze. The patient tolerated the procedure well and was taken to recovery room in stable condition. Needle, sponge, and instrument counts were correct at the end of the case.         Candido James MD      [id number]  D:  05/13/2020 13:11  T:  05/13/2020 15:29  JOB #:  [id number]  CC:  Jessica Kaur MD

## 2022-03-18 PROBLEM — K61.1 PERIRECTAL ABSCESS: Status: ACTIVE | Noted: 2020-05-09

## 2023-05-09 ENCOUNTER — HOSPITAL ENCOUNTER (EMERGENCY)
Facility: HOSPITAL | Age: 58
Discharge: HOME | End: 2023-05-10
Attending: EMERGENCY MEDICINE
Payer: COMMERCIAL

## 2023-05-09 VITALS
SYSTOLIC BLOOD PRESSURE: 130 MMHG | RESPIRATION RATE: 22 BRPM | DIASTOLIC BLOOD PRESSURE: 79 MMHG | BODY MASS INDEX: 38.19 KG/M2 | TEMPERATURE: 97.7 F | OXYGEN SATURATION: 91 % | WEIGHT: 282 LBS | HEIGHT: 72 IN | HEART RATE: 84 BPM

## 2023-05-09 DIAGNOSIS — R10.11 ABDOMINAL PAIN, RIGHT UPPER QUADRANT: Primary | ICD-10-CM

## 2023-05-09 DIAGNOSIS — K76.0 HEPATIC STEATOSIS: ICD-10-CM

## 2023-05-09 DIAGNOSIS — R74.8 ELEVATED LIPASE: ICD-10-CM

## 2023-05-09 LAB
ALBUMIN SERPL-MCNC: 3.5 G/DL (ref 3.5–5)
ALBUMIN/GLOB SERPL: 0.9 (ref 1.1–2.2)
ALP SERPL-CCNC: 54 U/L (ref 45–117)
ALT SERPL-CCNC: 66 U/L (ref 12–78)
ANION GAP SERPL CALC-SCNC: 3 MMOL/L (ref 5–15)
AST SERPL-CCNC: 42 U/L (ref 15–37)
BASOPHILS # BLD: 0.1 K/UL (ref 0–0.1)
BASOPHILS NFR BLD: 1 % (ref 0–1)
BILIRUB SERPL-MCNC: 0.5 MG/DL (ref 0.2–1)
BUN SERPL-MCNC: 22 MG/DL (ref 6–20)
BUN/CREAT SERPL: 20 (ref 12–20)
CALCIUM SERPL-MCNC: 8.7 MG/DL (ref 8.5–10.1)
CHLORIDE SERPL-SCNC: 111 MMOL/L (ref 97–108)
CO2 SERPL-SCNC: 25 MMOL/L (ref 21–32)
COMMENT:: NORMAL
CREAT SERPL-MCNC: 1.09 MG/DL (ref 0.7–1.3)
DIFFERENTIAL METHOD BLD: ABNORMAL
EOSINOPHIL # BLD: 0.3 K/UL (ref 0–0.4)
EOSINOPHIL NFR BLD: 4 % (ref 0–7)
ERYTHROCYTE [DISTWIDTH] IN BLOOD BY AUTOMATED COUNT: 12.2 % (ref 11.5–14.5)
GLOBULIN SER CALC-MCNC: 3.8 G/DL (ref 2–4)
GLUCOSE SERPL-MCNC: 116 MG/DL (ref 65–100)
HCT VFR BLD AUTO: 47.2 % (ref 36.6–50.3)
HGB BLD-MCNC: 16.3 G/DL (ref 12.1–17)
IMM GRANULOCYTES # BLD AUTO: 0 K/UL (ref 0–0.04)
IMM GRANULOCYTES NFR BLD AUTO: 0 % (ref 0–0.5)
LIPASE SERPL-CCNC: 721 U/L (ref 73–393)
LYMPHOCYTES # BLD: 3.6 K/UL (ref 0.8–3.5)
LYMPHOCYTES NFR BLD: 44 % (ref 12–49)
MCH RBC QN AUTO: 29.9 PG (ref 26–34)
MCHC RBC AUTO-ENTMCNC: 34.5 G/DL (ref 30–36.5)
MCV RBC AUTO: 86.6 FL (ref 80–99)
MONOCYTES # BLD: 0.7 K/UL (ref 0–1)
MONOCYTES NFR BLD: 8 % (ref 5–13)
NEUTS SEG # BLD: 3.5 K/UL (ref 1.8–8)
NEUTS SEG NFR BLD: 43 % (ref 32–75)
NRBC # BLD: 0 K/UL (ref 0–0.01)
NRBC BLD-RTO: 0 PER 100 WBC
PLATELET # BLD AUTO: 253 K/UL (ref 150–400)
PMV BLD AUTO: 9.3 FL (ref 8.9–12.9)
POTASSIUM SERPL-SCNC: 3.9 MMOL/L (ref 3.5–5.1)
PROT SERPL-MCNC: 7.3 G/DL (ref 6.4–8.2)
RBC # BLD AUTO: 5.45 M/UL (ref 4.1–5.7)
SODIUM SERPL-SCNC: 139 MMOL/L (ref 136–145)
SPECIMEN HOLD: NORMAL
WBC # BLD AUTO: 8.1 K/UL (ref 4.1–11.1)

## 2023-05-09 PROCEDURE — 81001 URINALYSIS AUTO W/SCOPE: CPT

## 2023-05-09 PROCEDURE — 6360000002 HC RX W HCPCS: Performed by: EMERGENCY MEDICINE

## 2023-05-09 PROCEDURE — 85025 COMPLETE CBC W/AUTO DIFF WBC: CPT

## 2023-05-09 PROCEDURE — 80053 COMPREHEN METABOLIC PANEL: CPT

## 2023-05-09 PROCEDURE — 83690 ASSAY OF LIPASE: CPT

## 2023-05-09 PROCEDURE — 99285 EMERGENCY DEPT VISIT HI MDM: CPT

## 2023-05-09 PROCEDURE — 36415 COLL VENOUS BLD VENIPUNCTURE: CPT

## 2023-05-09 PROCEDURE — 6370000000 HC RX 637 (ALT 250 FOR IP): Performed by: EMERGENCY MEDICINE

## 2023-05-09 PROCEDURE — 81002 URINALYSIS NONAUTO W/O SCOPE: CPT

## 2023-05-09 PROCEDURE — 96374 THER/PROPH/DIAG INJ IV PUSH: CPT

## 2023-05-09 PROCEDURE — 2580000003 HC RX 258: Performed by: EMERGENCY MEDICINE

## 2023-05-09 RX ORDER — 0.9 % SODIUM CHLORIDE 0.9 %
1000 INTRAVENOUS SOLUTION INTRAVENOUS ONCE
Status: COMPLETED | OUTPATIENT
Start: 2023-05-09 | End: 2023-05-10

## 2023-05-09 RX ORDER — KETOROLAC TROMETHAMINE 30 MG/ML
15 INJECTION, SOLUTION INTRAMUSCULAR; INTRAVENOUS
Status: COMPLETED | OUTPATIENT
Start: 2023-05-09 | End: 2023-05-09

## 2023-05-09 RX ORDER — DICYCLOMINE HYDROCHLORIDE 10 MG/1
10 CAPSULE ORAL
Qty: 20 CAPSULE | Refills: 0 | Status: SHIPPED | OUTPATIENT
Start: 2023-05-09 | End: 2023-05-10 | Stop reason: SDUPTHER

## 2023-05-09 RX ORDER — ONDANSETRON 2 MG/ML
4 INJECTION INTRAMUSCULAR; INTRAVENOUS EVERY 6 HOURS PRN
Status: DISCONTINUED | OUTPATIENT
Start: 2023-05-09 | End: 2023-05-10

## 2023-05-09 RX ORDER — DICYCLOMINE HCL 20 MG
20 TABLET ORAL
Status: DISCONTINUED | OUTPATIENT
Start: 2023-05-09 | End: 2023-05-10

## 2023-05-09 RX ADMIN — SODIUM CHLORIDE 1000 ML: 9 INJECTION, SOLUTION INTRAVENOUS at 23:06

## 2023-05-09 RX ADMIN — DICYCLOMINE HYDROCHLORIDE 20 MG: 20 TABLET ORAL at 23:12

## 2023-05-09 RX ADMIN — KETOROLAC TROMETHAMINE 15 MG: 30 INJECTION, SOLUTION INTRAMUSCULAR; INTRAVENOUS at 23:07

## 2023-05-09 ASSESSMENT — PAIN SCALES - GENERAL: PAINLEVEL_OUTOF10: 8

## 2023-05-09 ASSESSMENT — PAIN DESCRIPTION - DESCRIPTORS: DESCRIPTORS: SHARP

## 2023-05-09 ASSESSMENT — LIFESTYLE VARIABLES
HOW MANY STANDARD DRINKS CONTAINING ALCOHOL DO YOU HAVE ON A TYPICAL DAY: PATIENT DOES NOT DRINK
HOW OFTEN DO YOU HAVE A DRINK CONTAINING ALCOHOL: MONTHLY OR LESS

## 2023-05-09 ASSESSMENT — PAIN DESCRIPTION - LOCATION: LOCATION: ABDOMEN

## 2023-05-10 ENCOUNTER — APPOINTMENT (OUTPATIENT)
Facility: HOSPITAL | Age: 58
End: 2023-05-10
Payer: COMMERCIAL

## 2023-05-10 LAB
APPEARANCE UR: CLEAR
BACTERIA URNS QL MICRO: NEGATIVE /HPF
BILIRUB UR QL: NEGATIVE
COLOR UR: ABNORMAL
EKG ATRIAL RATE: 74 BPM
EKG DIAGNOSIS: NORMAL
EKG P AXIS: 55 DEGREES
EKG P-R INTERVAL: 162 MS
EKG Q-T INTERVAL: 360 MS
EKG QRS DURATION: 74 MS
EKG QTC CALCULATION (BAZETT): 399 MS
EKG R AXIS: 56 DEGREES
EKG T AXIS: 38 DEGREES
EKG VENTRICULAR RATE: 74 BPM
EPITH CASTS URNS QL MICRO: ABNORMAL /LPF
GLUCOSE UR STRIP.AUTO-MCNC: NEGATIVE MG/DL
HGB UR QL STRIP: NEGATIVE
HYALINE CASTS URNS QL MICRO: ABNORMAL /LPF (ref 0–2)
KETONES UR QL STRIP.AUTO: NEGATIVE MG/DL
LEUKOCYTE ESTERASE UR QL STRIP.AUTO: ABNORMAL
NITRITE UR QL STRIP.AUTO: NEGATIVE
PH UR STRIP: 6.5 (ref 5–8)
PROT UR STRIP-MCNC: NEGATIVE MG/DL
RBC #/AREA URNS HPF: ABNORMAL /HPF (ref 0–5)
SP GR UR REFRACTOMETRY: 1.02
UROBILINOGEN UR QL STRIP.AUTO: 1 EU/DL (ref 0.2–1)
WBC URNS QL MICRO: ABNORMAL /HPF (ref 0–4)

## 2023-05-10 PROCEDURE — 96374 THER/PROPH/DIAG INJ IV PUSH: CPT

## 2023-05-10 PROCEDURE — 76705 ECHO EXAM OF ABDOMEN: CPT

## 2023-05-10 PROCEDURE — 74177 CT ABD & PELVIS W/CONTRAST: CPT

## 2023-05-10 PROCEDURE — 6360000002 HC RX W HCPCS: Performed by: EMERGENCY MEDICINE

## 2023-05-10 PROCEDURE — 6360000004 HC RX CONTRAST MEDICATION: Performed by: EMERGENCY MEDICINE

## 2023-05-10 PROCEDURE — 6370000000 HC RX 637 (ALT 250 FOR IP): Performed by: EMERGENCY MEDICINE

## 2023-05-10 RX ORDER — CLONAZEPAM 0.5 MG/1
TABLET ORAL PRN
COMMUNITY

## 2023-05-10 RX ORDER — ONDANSETRON 2 MG/ML
4 INJECTION INTRAMUSCULAR; INTRAVENOUS
Status: COMPLETED | OUTPATIENT
Start: 2023-05-10 | End: 2023-05-10

## 2023-05-10 RX ORDER — DICYCLOMINE HCL 20 MG
20 TABLET ORAL
Status: COMPLETED | OUTPATIENT
Start: 2023-05-10 | End: 2023-05-10

## 2023-05-10 RX ORDER — PANTOPRAZOLE SODIUM 40 MG/1
40 TABLET, DELAYED RELEASE ORAL
Qty: 30 TABLET | Refills: 0 | Status: SHIPPED | OUTPATIENT
Start: 2023-05-10

## 2023-05-10 RX ORDER — ONDANSETRON 4 MG/1
4 TABLET, FILM COATED ORAL EVERY 8 HOURS PRN
Qty: 15 TABLET | Refills: 0 | Status: SHIPPED | OUTPATIENT
Start: 2023-05-10

## 2023-05-10 RX ORDER — IBUPROFEN 800 MG/1
TABLET ORAL EVERY 6 HOURS PRN
COMMUNITY

## 2023-05-10 RX ORDER — DEXTROAMPHETAMINE SACCHARATE, AMPHETAMINE ASPARTATE, DEXTROAMPHETAMINE SULFATE AND AMPHETAMINE SULFATE 5; 5; 5; 5 MG/1; MG/1; MG/1; MG/1
TABLET ORAL 3 TIMES DAILY
COMMUNITY

## 2023-05-10 RX ORDER — DICYCLOMINE HYDROCHLORIDE 10 MG/1
10 CAPSULE ORAL
Qty: 20 CAPSULE | Refills: 0 | Status: SHIPPED | OUTPATIENT
Start: 2023-05-10

## 2023-05-10 RX ADMIN — DICYCLOMINE HYDROCHLORIDE 20 MG: 20 TABLET ORAL at 04:11

## 2023-05-10 RX ADMIN — IOPAMIDOL 100 ML: 755 INJECTION, SOLUTION INTRAVENOUS at 02:21

## 2023-05-10 RX ADMIN — ONDANSETRON 4 MG: 2 INJECTION INTRAMUSCULAR; INTRAVENOUS at 04:11

## 2023-05-10 ASSESSMENT — PAIN DESCRIPTION - DESCRIPTORS: DESCRIPTORS: DISCOMFORT

## 2023-05-10 ASSESSMENT — PAIN - FUNCTIONAL ASSESSMENT: PAIN_FUNCTIONAL_ASSESSMENT: ACTIVITIES ARE NOT PREVENTED

## 2023-05-10 ASSESSMENT — PAIN SCALES - GENERAL: PAINLEVEL_OUTOF10: 5

## 2023-05-10 ASSESSMENT — PAIN DESCRIPTION - ORIENTATION: ORIENTATION: UPPER

## 2023-05-10 ASSESSMENT — PAIN DESCRIPTION - LOCATION: LOCATION: ABDOMEN

## 2023-05-11 NOTE — ED PROVIDER NOTES
[facility] EMERGENCY DEPT  EMERGENCY DEPARTMENT ENCOUNTER       Pt Name: Connor Paniagua  MRN: [id number]  [place] 1965  Date of evaluation: 5/9/2023  Provider: Dori Cam MD   PCP: Shelia Church MD  Note Started: 11:11 PM 5/10/23     CHIEF COMPLAINT       Chief Complaint   Patient presents with    Abdominal Pain     Pt arrives via EMS from [facility] with RUQ abdominal pain that began yesterday , worsens when eating. Pain worsening . HISTORY OF PRESENT ILLNESS: 1 or more elements      History From: Patient  None     Connor Paniagua is a 65 y.o. male with history of ADD, bipolar, fatty liver disease, enlarged prostate, obesity presents to the ED with right upper quadrant pain that started yesterday. It was mild initially, but this morning it was much worse. States that he has been having it intermittently throughout the day, but it is becoming very frequent. His pain is located in his right upper quadrant and it is a sharp crampy type pain. It is associated with nausea but no vomiting. Denies any fevers or chills. He had diarrhea yesterday but that has resolved. States his pain comes on and is extremely strong for about a minute and then goes away. Pain takes his breath away. He states it is like contractions. He has been taking ibuprofen and Tylenol without relief. States he ate at about 4 PM and pain became much more frequent around 5 PM.  Past surgical history significant for appendectomy but no other abdominal surgeries. REVIEW OF SYSTEMS      Review of Systems     Positives and Pertinent negatives as per HPI.     PAST HISTORY     Past Medical History:  Past Medical History:   Diagnosis Date    ADHD (attention deficit hyperactivity disorder)     Bipolar 2 disorder (Perri Bile)     Fatty liver     as of 5/27/15; pt states he is unaware he had fatty    Sciatica          Past Surgical History:  Past Surgical History:   Procedure Laterality Date    APPENDECTOMY  age 17    KNEE ARTHROSCOPY Left

## 2024-03-02 ENCOUNTER — HOSPITAL ENCOUNTER (OUTPATIENT)
Facility: HOSPITAL | Age: 59
Discharge: HOME | End: 2024-03-05

## 2024-03-02 DIAGNOSIS — M51.37 DEGENERATION OF INTERVERTEBRAL DISC OF LUMBOSACRAL REGION: ICD-10-CM

## 2024-03-02 DIAGNOSIS — M79.18 DIFFUSE MYOFASCIAL PAIN SYNDROME: ICD-10-CM

## 2024-03-02 DIAGNOSIS — G62.89 DISEASE RELATED PERIPHERAL NEUROPATHY: ICD-10-CM

## 2024-03-02 DIAGNOSIS — M47.816 LUMBAR SPONDYLOSIS: ICD-10-CM

## 2024-03-07 ENCOUNTER — HOSPITAL ENCOUNTER (OUTPATIENT)
Facility: HOSPITAL | Age: 59
Discharge: HOME | End: 2024-03-07
Payer: COMMERCIAL

## 2024-03-07 VITALS — BODY MASS INDEX: 37.97 KG/M2 | WEIGHT: 280 LBS

## 2024-03-07 PROCEDURE — A9579 GAD-BASE MR CONTRAST NOS,1ML: HCPCS | Performed by: RADIOLOGY

## 2024-03-07 PROCEDURE — 72158 MRI LUMBAR SPINE W/O & W/DYE: CPT

## 2024-03-07 PROCEDURE — 6360000004 HC RX CONTRAST MEDICATION: Performed by: RADIOLOGY

## 2024-03-07 RX ADMIN — GADOTERIDOL 20 ML: 279.3 INJECTION, SOLUTION INTRAVENOUS at 07:22

## 2024-07-20 ENCOUNTER — APPOINTMENT (OUTPATIENT)
Dept: GENERAL RADIOLOGY | Facility: HOSPITAL | Age: 59
End: 2024-07-20

## 2024-07-20 ENCOUNTER — HOSPITAL ENCOUNTER (EMERGENCY)
Facility: HOSPITAL | Age: 59
Discharge: HOME | End: 2024-07-20

## 2024-07-20 VITALS
TEMPERATURE: 98.6 F | HEIGHT: 60 IN | RESPIRATION RATE: 16 BRPM | SYSTOLIC BLOOD PRESSURE: 133 MMHG | DIASTOLIC BLOOD PRESSURE: 103 MMHG | WEIGHT: 280 LBS | HEART RATE: 90 BPM | OXYGEN SATURATION: 96 % | BODY MASS INDEX: 54.97 KG/M2

## 2024-07-20 DIAGNOSIS — M25.561 ACUTE PAIN OF RIGHT KNEE: Primary | ICD-10-CM

## 2024-07-20 DIAGNOSIS — M25.531 ACUTE PAIN OF RIGHT WRIST: ICD-10-CM

## 2024-07-20 PROCEDURE — 99283 EMERGENCY DEPT VISIT LOW MDM: CPT

## 2024-07-20 PROCEDURE — 73110 X-RAY EXAM OF WRIST: CPT

## 2024-07-20 PROCEDURE — 73562 X-RAY EXAM OF KNEE 3: CPT

## 2024-07-20 RX ORDER — ACETAMINOPHEN 500 MG
1000 TABLET ORAL ONCE
Status: COMPLETED | OUTPATIENT
Start: 2024-07-20 | End: 2024-07-20

## 2024-07-20 RX ADMIN — ACETAMINOPHEN 1000 MG: 500 TABLET, FILM COATED ORAL at 12:11

## 2024-07-20 NOTE — ED PROVIDER NOTES
Subjective   History of Present Illness  58-year-old  male presents to the emergency room with complaint of right wrist pain and right knee pain status post fall this morning.      Review of Systems   Musculoskeletal:  Positive for arthralgias.        Right wrist pain and right knee pain status post fall.       History reviewed. No pertinent past medical history.    Allergies   Allergen Reactions    Penicillins Unknown - High Severity       History reviewed. No pertinent surgical history.    History reviewed. No pertinent family history.    Social History     Socioeconomic History    Marital status:            Objective   Physical Exam  Constitutional:       General: He is not in acute distress.     Appearance: Normal appearance. He is not ill-appearing, toxic-appearing or diaphoretic.   HENT:      Head: Normocephalic and atraumatic.   Eyes:      Conjunctiva/sclera: Conjunctivae normal.      Pupils: Pupils are equal, round, and reactive to light.   Cardiovascular:      Rate and Rhythm: Normal rate.      Pulses: Normal pulses.   Pulmonary:      Effort: Pulmonary effort is normal.   Musculoskeletal:        Arms:         Legs:    Neurological:      Mental Status: He is alert.         Procedures           ED Course          Labs Reviewed - No data to display                            XR Knee 3 View Right    Result Date: 7/20/2024  Impression: Multiple views of the right knee were obtained. No acute fracture or dislocation is identified. Mild osteoarthritic changes are present. Alignment is otherwise unremarkable. Patellar enthesophytes are noted. Trace knee effusion. Soft tissues are unremarkable. Electronically Signed: Immanuel Escalona MD  7/20/2024 12:17 PM EDT  Workstation ID: [id number]    XR Wrist 3+ View Right    Result Date: 7/20/2024  Impression: Multiple views of the right wrist were obtained. No acute fracture or dislocation is identified. Bony alignment appears intact. No osseous erosions are  seen. Soft tissues demonstrate no acute abnormality. [place] sclerotic focus within the distal radius may represent a bone island. 4 mm radiopaque structure within the soft tissues between the first and second digits. Please correlate clinically for foreign body. Electronically Signed: Immanuel Escalona MD  7/20/2024 11:33 AM EDT  Workstation ID: [id number]            Medications   acetaminophen (TYLENOL) tablet 1,000 mg (1,000 mg Oral Given 7/20/24 1211)      Medical Decision Making  57 y/o morbidly obese male presents to ER with c/o right wrist and right knee pain s/p fall this morning.    Problems Addressed:  Acute pain of right knee:     Details: X-ray of right knee was ordered and obtained and was found to be negative for acute.  Knee sleeve ordered and applied prior to discharge.  Encourage patient to rest ice and elevate for the next couple days and patient verbalized understanding.  Ortho MD referral and contact information was given in discharge paperwork.  Acute pain of right wrist:     Details: X-ray of right wrist ordered and obtained and is negative for any acute osseous abnormality.  Placed patient in Ace wrap and sling and swath and advised patient to rest ice and elevate.  Referral and contact information was provided in discharge paperwork for [facility] hand specialist.    Amount and/or Complexity of Data Reviewed  Radiology: ordered.     Details: XR Knee 3 View Right    Result Date: 7/20/2024  Impression: Multiple views of the right knee were obtained. No acute fracture or dislocation is identified. Mild osteoarthritic changes are present. Alignment is otherwise unremarkable. Patellar enthesophytes are noted. Trace knee effusion. Soft tissues are unremarkable. Electronically Signed: Imamnuel Escalona MD  7/20/2024 12:17 PM EDT  Workstation ID: [id number]    XR Wrist 3+ View Right    Result Date: 7/20/2024  Impression: Multiple views of the right wrist were obtained. No acute fracture or dislocation  is identified. Bony alignment appears intact. No osseous erosions are seen. Soft tissues demonstrate no acute abnormality. [place] sclerotic focus within the distal radius may represent a bone island. 4 mm radiopaque structure within the soft tissues between the first and second digits. Please correlate clinically for foreign body. Electronically Signed: Immanuel Escalona MD  7/20/2024 11:33 AM EDT  Workstation ID: [id number]       Risk  OTC drugs.  Risk Details: Discharge home to self-care with strict instructions for RICE interventions.  Referral to Hand specialist and Ortho provided in D/c paperwork.        Final diagnoses:   Acute pain of right knee   Acute pain of right wrist       ED Disposition  ED Disposition       ED Disposition   Discharge    Condition   Stable    Comment   --               Rory King MD  [address] [address]  [phone]    Schedule an appointment as soon as possible for a visit in 3 days  As needed, If symptoms worsen    [facility] - [address] [address]  [phone]  Schedule an appointment as soon as possible for a visit in 3 days  As needed, If symptoms worsen for your right wrist pain.         Medication List      No changes were made to your prescriptions during this visit.            Natalya Vergara, APRN  07/20/24 [id number]

## 2024-07-20 NOTE — DISCHARGE INSTRUCTIONS
Rest, ice and elevate.  See Hand Specialist if wrist injury pain persists.  If pain persists in right knee, must follow up with Ortho MD.  Hand specialist and Ortho MD has been provided in this discharge paperwork.

## 2024-07-20 NOTE — NURSING NOTE
Pt states that he was walking up the stairs on his sons porch, and a board came loose, causing him to fall and injure his RUE and RLE.     No abrasions noted upon assessment.

## 2024-08-09 ENCOUNTER — HOSPITAL ENCOUNTER (OUTPATIENT)
Facility: HOSPITAL | Age: 59
Discharge: HOME | End: 2024-08-09
Attending: ORTHOPAEDIC SURGERY
Payer: COMMERCIAL

## 2024-08-09 ENCOUNTER — HOSPITAL ENCOUNTER (OUTPATIENT)
Facility: HOSPITAL | Age: 59
End: 2024-08-09
Attending: ORTHOPAEDIC SURGERY
Payer: COMMERCIAL

## 2024-08-09 DIAGNOSIS — M75.41 IMPINGEMENT SYNDROME OF RIGHT SHOULDER: ICD-10-CM

## 2024-08-09 DIAGNOSIS — M19.011 ARTHRITIS OF RIGHT ACROMIOCLAVICULAR JOINT: ICD-10-CM

## 2024-08-09 DIAGNOSIS — S46.011A TRAUMATIC TEAR OF RIGHT ROTATOR CUFF, UNSPECIFIED TEAR EXTENT, INITIAL ENCOUNTER: ICD-10-CM

## 2024-08-09 DIAGNOSIS — M25.561 RIGHT KNEE PAIN, UNSPECIFIED CHRONICITY: ICD-10-CM

## 2024-08-09 DIAGNOSIS — M67.921 BICEPS TENDINOPATHY, RIGHT: ICD-10-CM

## 2024-08-09 PROCEDURE — 73721 MRI JNT OF LWR EXTRE W/O DYE: CPT

## 2024-09-09 ENCOUNTER — TRANSCRIBE ORDERS (OUTPATIENT)
Facility: HOSPITAL | Age: 59
End: 2024-09-09

## 2024-09-09 DIAGNOSIS — S60.551D FOREIGN BODY OF RIGHT HAND, SUBSEQUENT ENCOUNTER: ICD-10-CM

## 2024-09-09 DIAGNOSIS — S69.91XD INJURY OF HAND, RIGHT, SUBSEQUENT ENCOUNTER: Primary | ICD-10-CM

## 2024-09-09 DIAGNOSIS — S63.501D SPRAIN OF FOREARM, RIGHT, SUBSEQUENT ENCOUNTER: ICD-10-CM

## 2024-09-18 ENCOUNTER — HOSPITAL ENCOUNTER (OUTPATIENT)
Facility: HOSPITAL | Age: 59
Discharge: HOME | End: 2024-09-21
Payer: COMMERCIAL

## 2024-09-18 DIAGNOSIS — S69.91XD INJURY OF RIGHT WRIST, SUBSEQUENT ENCOUNTER: ICD-10-CM

## 2024-09-18 DIAGNOSIS — S60.551D FOREIGN BODY OF RIGHT HAND, SUBSEQUENT ENCOUNTER: ICD-10-CM

## 2024-09-18 DIAGNOSIS — S63.501D SPRAIN OF RIGHT WRIST, SUBSEQUENT ENCOUNTER: ICD-10-CM

## 2024-09-18 PROCEDURE — 73221 MRI JOINT UPR EXTREM W/O DYE: CPT

## 2024-11-06 PROBLEM — M17.11 ARTHRITIS OF RIGHT KNEE: Status: ACTIVE | Noted: 2024-11-06

## 2024-11-19 ENCOUNTER — HOSPITAL ENCOUNTER (OUTPATIENT)
Facility: HOSPITAL | Age: 59
Discharge: HOME | End: 2024-11-22
Payer: COMMERCIAL

## 2024-11-19 VITALS
OXYGEN SATURATION: 98 % | WEIGHT: 285.72 LBS | SYSTOLIC BLOOD PRESSURE: 155 MMHG | HEIGHT: 71 IN | BODY MASS INDEX: 40 KG/M2 | DIASTOLIC BLOOD PRESSURE: 98 MMHG | RESPIRATION RATE: 20 BRPM | HEART RATE: 85 BPM | TEMPERATURE: 98 F

## 2024-11-19 LAB
ABO + RH BLD: NORMAL
ALBUMIN SERPL-MCNC: 3.9 G/DL (ref 3.5–5)
ALBUMIN/GLOB SERPL: 1.1 (ref 1.1–2.2)
ALP SERPL-CCNC: 61 U/L (ref 45–117)
ALT SERPL-CCNC: 56 U/L (ref 12–78)
ANION GAP SERPL CALC-SCNC: 3 MMOL/L (ref 2–12)
APPEARANCE UR: CLEAR
APTT PPP: 28.4 SEC (ref 22.1–31)
AST SERPL-CCNC: 28 U/L (ref 15–37)
BACTERIA URNS QL MICRO: NEGATIVE /HPF
BASOPHILS # BLD: 0 K/UL (ref 0–0.1)
BASOPHILS NFR BLD: 1 % (ref 0–1)
BILIRUB SERPL-MCNC: 0.9 MG/DL (ref 0.2–1)
BILIRUB UR QL: NEGATIVE
BLOOD GROUP ANTIBODIES SERPL: NORMAL
BUN SERPL-MCNC: 23 MG/DL (ref 6–20)
BUN/CREAT SERPL: 22 (ref 12–20)
CALCIUM SERPL-MCNC: 9.2 MG/DL (ref 8.5–10.1)
CHLORIDE SERPL-SCNC: 106 MMOL/L (ref 97–108)
CO2 SERPL-SCNC: 29 MMOL/L (ref 21–32)
COLOR UR: NORMAL
CREAT SERPL-MCNC: 1.05 MG/DL (ref 0.7–1.3)
DIFFERENTIAL METHOD BLD: ABNORMAL
EKG ATRIAL RATE: 79 BPM
EKG DIAGNOSIS: NORMAL
EKG P AXIS: 53 DEGREES
EKG P-R INTERVAL: 166 MS
EKG Q-T INTERVAL: 354 MS
EKG QRS DURATION: 74 MS
EKG QTC CALCULATION (BAZETT): 405 MS
EKG R AXIS: 51 DEGREES
EKG T AXIS: 43 DEGREES
EKG VENTRICULAR RATE: 79 BPM
EOSINOPHIL # BLD: 0.1 K/UL (ref 0–0.4)
EOSINOPHIL NFR BLD: 2 % (ref 0–7)
EPITH CASTS URNS QL MICRO: NORMAL /LPF
ERYTHROCYTE [DISTWIDTH] IN BLOOD BY AUTOMATED COUNT: 11.9 % (ref 11.5–14.5)
EST. AVERAGE GLUCOSE BLD GHB EST-MCNC: 117 MG/DL
GLOBULIN SER CALC-MCNC: 3.7 G/DL (ref 2–4)
GLUCOSE SERPL-MCNC: 119 MG/DL (ref 65–100)
GLUCOSE UR STRIP.AUTO-MCNC: NEGATIVE MG/DL
HBA1C MFR BLD: 5.7 % (ref 4–5.6)
HCT VFR BLD AUTO: 51.1 % (ref 36.6–50.3)
HGB BLD-MCNC: 17.3 G/DL (ref 12.1–17)
HGB UR QL STRIP: NEGATIVE
HYALINE CASTS URNS QL MICRO: NORMAL /LPF (ref 0–2)
IMM GRANULOCYTES # BLD AUTO: 0 K/UL (ref 0–0.04)
IMM GRANULOCYTES NFR BLD AUTO: 0 % (ref 0–0.5)
INR PPP: 1 (ref 0.9–1.1)
KETONES UR QL STRIP.AUTO: NEGATIVE MG/DL
LEUKOCYTE ESTERASE UR QL STRIP.AUTO: NEGATIVE
LYMPHOCYTES # BLD: 2.3 K/UL (ref 0.8–3.5)
LYMPHOCYTES NFR BLD: 39 % (ref 12–49)
MCH RBC QN AUTO: 29.7 PG (ref 26–34)
MCHC RBC AUTO-ENTMCNC: 33.9 G/DL (ref 30–36.5)
MCV RBC AUTO: 87.7 FL (ref 80–99)
MONOCYTES # BLD: 0.6 K/UL (ref 0–1)
MONOCYTES NFR BLD: 9 % (ref 5–13)
NEUTS SEG # BLD: 2.9 K/UL (ref 1.8–8)
NEUTS SEG NFR BLD: 49 % (ref 32–75)
NITRITE UR QL STRIP.AUTO: NEGATIVE
NRBC # BLD: 0 K/UL (ref 0–0.01)
NRBC BLD-RTO: 0 PER 100 WBC
PH UR STRIP: 6 (ref 5–8)
PLATELET # BLD AUTO: 215 K/UL (ref 150–400)
PMV BLD AUTO: 9.8 FL (ref 8.9–12.9)
POTASSIUM SERPL-SCNC: 4 MMOL/L (ref 3.5–5.1)
PROT SERPL-MCNC: 7.6 G/DL (ref 6.4–8.2)
PROT UR STRIP-MCNC: NEGATIVE MG/DL
PROTHROMBIN TIME: 10.1 SEC (ref 9–11.1)
RBC # BLD AUTO: 5.83 M/UL (ref 4.1–5.7)
RBC #/AREA URNS HPF: NORMAL /HPF (ref 0–5)
SODIUM SERPL-SCNC: 138 MMOL/L (ref 136–145)
SP GR UR REFRACTOMETRY: 1.02
SPECIMEN EXP DATE BLD: NORMAL
THERAPEUTIC RANGE: NORMAL SECS (ref 58–77)
URINE CULTURE IF INDICATED: NORMAL
UROBILINOGEN UR QL STRIP.AUTO: 1 EU/DL (ref 0.2–1)
WBC # BLD AUTO: 5.9 K/UL (ref 4.1–11.1)
WBC URNS QL MICRO: NORMAL /HPF (ref 0–4)

## 2024-11-19 PROCEDURE — 81001 URINALYSIS AUTO W/SCOPE: CPT

## 2024-11-19 PROCEDURE — 80307 DRUG TEST PRSMV CHEM ANLYZR: CPT

## 2024-11-19 PROCEDURE — 83036 HEMOGLOBIN GLYCOSYLATED A1C: CPT

## 2024-11-19 PROCEDURE — 36415 COLL VENOUS BLD VENIPUNCTURE: CPT

## 2024-11-19 PROCEDURE — 93005 ELECTROCARDIOGRAM TRACING: CPT | Performed by: ORTHOPAEDIC SURGERY

## 2024-11-19 PROCEDURE — 97161 PT EVAL LOW COMPLEX 20 MIN: CPT

## 2024-11-19 PROCEDURE — 86901 BLOOD TYPING SEROLOGIC RH(D): CPT

## 2024-11-19 PROCEDURE — 97530 THERAPEUTIC ACTIVITIES: CPT

## 2024-11-19 PROCEDURE — 85025 COMPLETE CBC W/AUTO DIFF WBC: CPT

## 2024-11-19 PROCEDURE — 85730 THROMBOPLASTIN TIME PARTIAL: CPT

## 2024-11-19 PROCEDURE — 85610 PROTHROMBIN TIME: CPT

## 2024-11-19 PROCEDURE — 80053 COMPREHEN METABOLIC PANEL: CPT

## 2024-11-19 PROCEDURE — 86850 RBC ANTIBODY SCREEN: CPT

## 2024-11-19 PROCEDURE — 86900 BLOOD TYPING SEROLOGIC ABO: CPT

## 2024-11-19 RX ORDER — TADALAFIL 5 MG/1
5 TABLET ORAL DAILY
COMMUNITY

## 2024-11-19 RX ORDER — SODIUM CHLORIDE, SODIUM LACTATE, POTASSIUM CHLORIDE, CALCIUM CHLORIDE 600; 310; 30; 20 MG/100ML; MG/100ML; MG/100ML; MG/100ML
INJECTION, SOLUTION INTRAVENOUS CONTINUOUS
OUTPATIENT
Start: 2024-12-03

## 2024-11-19 ASSESSMENT — KOOS JR
GOING UP OR DOWN STAIRS: SEVERE
HOW SEVERE IS YOUR KNEE STIFFNESS AFTER FIRST WAKING IN MORNING: MILD
RISING FROM SITTING: EXTREME
STRAIGHTENING KNEE FULLY: MILD
TWISING OR PIVOTING ON KNEE: SEVERE
STANDING UPRIGHT: MILD
BENDING TO THE FLOOR TO PICK UP OBJECT: EXTREME
KOOS JR TOTAL INTERVAL SCORE: 44.905

## 2024-11-19 ASSESSMENT — PROMIS GLOBAL HEALTH SCALE
TO WHAT EXTENT ARE YOU ABLE TO CARRY OUT YOUR EVERYDAY PHYSICAL ACTIVITIES SUCH AS WALKING, CLIMBING STAIRS, CARRYING GROCERIES, OR MOVING A CHAIR [ON A SCALE OF 1 (NOT AT ALL) TO 5 (COMPLETELY)]?: A LITTLE
IN GENERAL, HOW WOULD YOU RATE YOUR SATISFACTION WITH YOUR SOCIAL ACTIVITIES AND RELATIONSHIPS [ON A SCALE OF 1 (POOR) TO 5 (EXCELLENT)]?: FAIR
SUM OF RESPONSES TO QUESTIONS 3, 6, 7, & 8: 13
IN THE PAST 7 DAYS, HOW WOULD YOU RATE YOUR FATIGUE ON AVERAGE [ON A SCALE FROM 1 (NONE) TO 5 (VERY SEVERE)]?: MODERATE
IN GENERAL, WOULD YOU SAY YOUR QUALITY OF LIFE IS...[ON A SCALE OF 1 (POOR) TO 5 (EXCELLENT)]: FAIR
IN THE PAST 7 DAYS, HOW OFTEN HAVE YOU BEEN BOTHERED BY EMOTIONAL PROBLEMS, SUCH AS FEELING ANXIOUS, DEPRESSED, OR IRRITABLE [ON A SCALE FROM 1 (NEVER) TO 5 (ALWAYS)]?: OFTEN
IN GENERAL, HOW WOULD YOU RATE YOUR PHYSICAL HEALTH [ON A SCALE OF 1 (POOR) TO 5 (EXCELLENT)]?: FAIR
WHO IS THE PERSON COMPLETING THE PROMIS V1.1 SURVEY?: SELF
SUM OF RESPONSES TO QUESTIONS 2, 4, 5, & 10: 8
IN GENERAL, HOW WOULD YOU RATE YOUR MENTAL HEALTH, INCLUDING YOUR MOOD AND YOUR ABILITY TO THINK [ON A SCALE OF 1 (POOR) TO 5 (EXCELLENT)]?: FAIR
IN THE PAST 7 DAYS, HOW WOULD YOU RATE YOUR PAIN ON AVERAGE [ON A SCALE FROM 0 (NO PAIN) TO 10 (WORST IMAGINABLE PAIN)]?: 6
IN GENERAL, WOULD YOU SAY YOUR HEALTH IS...[ON A SCALE OF 1 (POOR) TO 5 (EXCELLENT)]: FAIR
HOW IS THE PROMIS V1.1 BEING ADMINISTERED?: PAPER
IN GENERAL, PLEASE RATE HOW WELL YOU CARRY OUT YOUR USUAL SOCIAL ACTIVITIES (INCLUDES ACTIVITIES AT HOME, AT WORK, AND IN YOUR COMMUNITY, AND RESPONSIBILITIES AS A PARENT, CHILD, SPOUSE, EMPLOYEE, FRIEND, ETC) [ON A SCALE OF 1 (POOR) TO 5 (EXCELLENT)]?: FAIR

## 2024-11-19 ASSESSMENT — PAIN SCALES - GENERAL: PAINLEVEL_OUTOF10: 3

## 2024-11-19 ASSESSMENT — PAIN DESCRIPTION - PAIN TYPE: TYPE: CHRONIC PAIN

## 2024-11-19 ASSESSMENT — PAIN DESCRIPTION - LOCATION: LOCATION: KNEE

## 2024-11-19 ASSESSMENT — PAIN DESCRIPTION - ORIENTATION: ORIENTATION: RIGHT

## 2024-11-19 ASSESSMENT — PAIN DESCRIPTION - DESCRIPTORS: DESCRIPTORS: ACHING

## 2024-11-19 NOTE — PROGRESS NOTES
[facility]  Joint/Spine Preoperative Instructions        Surgery Date 12/3/2024          Time of Arrival to be called between 2-5pm on 12/2/24 on [phone]     1. On the day of your surgery, please report to the Surgical Services Registration Desk and sign in at your designated time. The Surgery Center is located to the right of the Emergency Room.     2. You must have someone with you to drive you home. You should not drive a car for 24 hours following surgery. Please make arrangements for a friend or family member to stay with you for the first 24 hours after your surgery.    3. No food after midnight 12/2/24.  Medications morning of surgery should be taken with a sip of water.  Please follow pre-surgery drink instructions that were given at your Pre Admission Testing appointment.      4. We recommend you do not drink any alcoholic beverages for 24 hours before and after your surgery.    5. Contact your surgeon’s office for instructions on the following medications: non-steroidal anti-inflammatory drugs (i.e. Advil, Aleve), vitamins, and supplements. (Some surgeon’s will want you to stop these medications prior to surgery and others may allow you to take them)  **If you are currently taking Plavix, Coumadin, Aspirin and/or other blood-thinning agents, contact your surgeon for instructions.** Your surgeon will partner with the physician prescribing these medications to determine if it is safe to stop or if you need to continue taking.  Please do not stop taking these medications without instructions from your surgeon    6. Wear comfortable clothes.  Wear glasses instead of contacts.  Do not bring any money or jewelry. Please bring picture ID, insurance card, and any prearranged co-payment or hospital payment.  Do not wear make-up, particularly mascara the morning of your surgery.  Do not wear nail polish, particularly if you are having foot /hand surgery.  Wear your hair loose 
Hibiclens/Chlorhexidine    Preventing Infections Before and After - Your Surgery    IMPORTANT INSTRUCTIONS    Please read and follow these instructions carefully. If you are unable to comply with the below instructions your procedure will be cancelled.       Every Night for Three (3) nights before your surgery:  Shower with an antibacterial soap, such as Dial, or the soap provided at your preassessment appointment. A shower is better than a bath for cleaning your skin.  If needed, ask someone to help you reach all areas of your body. Don’t forget to clean your belly button with every shower.    The night before your surgery:   If you lose your Hibiclens/chlorhexidine please contact surgery center or you can purchase it at a local pharmacy  On the night before your surgery, shower with an antibacterial soap, such as Dial, or the soap provided at your preassessment appointment.   With bottle of Hibiclens/Chlorhexidine in hand, turn water off.  Apply Hibiclens antiseptic skin cleanser with a clean, freshly washed washcloth.  Gently apply to your body from chin to toes (except the genital area) and especially the area(s) where your incision(s) will be.  Leave Hibiclens/Chlorhexidine on your skin for at least 20 seconds.    CAUTION: If needed, Hibiclens/chlorhexidine may be used to clean the folds of skin of the legs (such as in the area of the groin) and on your buttocks and hips. However, do not use Hibiclens/Chlorhexidine above the neck or in the genital area (your bottom) or put inside any area of your body.  Turn the water back on and rinse.  Dry gently with a clean, freshly washed towel.  After your shower, do not use any powder, deodorant, perfumes or lotion.  Use clean, freshly washed towels and washcloths every time you shower.  Wear clean, freshly washed pajamas to bed the night before surgery.  Sleep on clean, freshly washed sheets.  Do not allow pets to sleep in your bed with you.        The Morning of your 
Incentive Spirometer        Using the incentive spirometer helps expand the small air sacs of your lungs, helps you breathe deeply, and helps improve your lung function.  Use your incentive spirometer twice a day (10 breaths each time) prior to surgery.      How to Use Your Incentive Spirometer:  Hold the incentive spirometer in an upright position.   Breathe out as usual.   Place the mouthpiece in your mouth and seal your lips tightly around it.   Take a deep breath.  Breathe in slowly and as deeply as possible. Keep the blue flow rate guide between the arrows.   Hold your breath as long as possible. Then exhale slowly and allow the piston to fall to the bottom of the column.   Rest for a few seconds and repeat steps one through five at least 10 times.     PAT Tidal Volume_3000_________________  x___________2_____  Date_______11/19/24________________    BRING THE INCENTIVE SPIROMETER WITH YOU TO THE HOSPITAL ON THE DAY OF YOUR SURGERY.  Opportunity given to ask and answer questions as well as to observe return demonstration.    Patient signature_____________________________    Witness____________________________  
Orthopedic and Spine Patients:  Instructions on When You Can   Eat or Drink Before Surgery      You have been provided 2 pre-surgery drinks received at your pre-admission testing appointment.    Night before surgery:  You should drink one bottle of the  pre-surgery drink at bedtime. No food after midnight!    Day of Surgery:  Complete 2nd bottle of the pre-surgery drink 1 hour prior to arrival at hospital.  For questions call Pre-Admission Testing at [phone].  They are available from 8:00am-5:00pm, Monday through Friday.  
mental health, including your mood and your ability to think?: Fair  To what extent are you able to carry out your everyday physical activities such as walking, climbing stairs, carrying groceries, or moving a chair?: A little  In the past 7 days how often have you been bothered by emotional problems such as feeling anxious, depressed or irritable?: Often  In the past 7 days how would you rate your fatigue on average?: Moderate  In the past 7 days how would you rate your pain on average?: 6  In general, please rate how well you carry out your usual social activities and roles. (This includes activities at home, at work and in your community, and responsibilities as a parent, child, spouse, employee, friend, etc.): Fair  In general, how would you rate your satisfaction with your social activities and relationships?: Fair  How comfortable are you filling out medical forms by yourself?: Not at all  What amount of pain have you experienced in the last week in your other knee/hip?: Moderate  My BACK PAIN at the moment is: Moderate  Have you taken chronic narcotics in the last 90 days?: No    Mode of Collection: Paper  Person Completing Survey: Self    Scores    PROMIS Physical Score: 13  PROMIS Mental Score: 8        
Score: 24/24                            Cutoff score <=171,2,3 had higher odds of discharging home with home health or need of SNF/IPR.    1. Isidra Coleman, Rabia Hyman, Levon Briggs, Giana Brown, Jose Holman, Edwardo Coleman.  Validity of the AM-PAC “6-Clicks” Inpatient Daily Activity and Basic Mobility Short Forms. Physical Therapy Mar 2014, 94 3) [phone]; DOI: 10.2522/ptj.72640329  2. Kiran HERNANDEZ, Aisha ENGLAND, Gretchen ENGLAND, Favian ENGLAND. Association of AM-PAC \"6-Clicks\" Basic Mobility and Daily Activity Scores With Discharge Destination. Phys Ther. 2021 Apr 4;101(4):rlpk697. doi: 10.1093/ptj/etns378. PMID: 26540023.  3. Emeterio ENGLAND, Ganesh D, Gosia S, Tressa K, Bobuacar S. Activity Measure for Post-Acute Care \"6-Clicks\" Basic Mobility Scores Predict Discharge Destination After Acute Care Hospitalization in Select Patient Groups: A Retrospective, Observational Study. Arch Rehabil Res Clin Transl. 2022 Jul 16;4(3):756691. doi: 10.1016/j.arrct.2022.110153. PMID: 26642230; PMCID: [id number].  4. Jared FUENTES, Marilu S, Ilya W, Katie P. AM-PAC Short Forms Manual 4.0. Revised 2/2020.          8 min [x]Eval - untimed                          Therapeutic Procedures:  Tx Min Procedure, Rationale, Specifics   20 The patient was educated on:  [x]         Importance of post-operative mobility to achieve their desired outcomes and restore biological function  [x]         The key post-operative time frame to address ROM to prevent additional complications    71549 Therapeutic Exercise (timed):  increase ROM, strength, coordination, balance, and proprioception to improve patient's ability to progress to PLOF and address remaining functional goals.  Handout provided.     85182 Gait Training (timed):  25 feet with no (assistive device) over level surfaces with indep level of assist. Cueing for fall prevention.  To improve safety and dynamic movement with household/community ambulation.     03873 Therapeutic Activity

## 2024-11-20 LAB
BACTERIA SPEC CULT: NORMAL
BACTERIA SPEC CULT: NORMAL
COMMENT:: NORMAL
COTININE UR QL SCN: NEGATIVE NG/ML
SERVICE CMNT-IMP: NORMAL

## 2024-12-02 NOTE — DISCHARGE INSTRUCTIONS
Discharge Instructions Knee Replacement  Dr. Blount  Patient Name:  Patrick Hinds    Follow up care  Follow up visit with Dr. Blount in 4 weeks.  Call [phone] extension [phone] (John) to make an appointment.  If Home Health has been arranged for you, they will call you to arrange dates/times for visits. Call them if you do not hear from them within 24 hours after you go home.    Activity at home  Take a short walk every hour; except at night when sleeping.  Do your Home Exercise Program 3 times every day.   After exercising lie down and elevate your leg on pillows for 15-30 minutes to decrease swelling.  Refer to your patient notebook for more information.  Bathing and caring for your incision  You have a waterproof surgical bandage (called clair). This bandage is to remain in place until your follow up appointment.   You may shower the day after surgery.      Preventing blood clots  Take Aspirin 81mg twice each day for one month (30 days) following surgery.   Call Dr. Blount for signs of a blood clot in your leg: calf pain, tenderness, redness, swelling of lower leg   Preventing lung congestion  Use your incentive spirometer 4 times a day; do 10 repetitions each time  Remember to keep the small blue ball between the two arrows when taking a slow, deep breath   Pain Management  Get up and walk a short distance to relieve pain and stiffness.  Place ice wrap on your knee except when you are walking. The gel ice packs should be changed about every 4 hours.  Elevate your leg on pillows for 15-30 minutes.   Pain Medications  Take Tylenol 650mg (take two 325mg tablets) every 6 hours for the next 4 weeks.  Take Meloxicam (Mobic) every day as prescribed to decrease swelling and inflammation. Do not take Meloxicam if you have had stomach ulcers.  Take Famotidine (Pepcid) 20mg twice a day to prevent an upset stomach (if taking Aspirin and/or Meloxicam).  If needed, take Tramadol (narcotic pain pill) every 6 hours as

## 2024-12-03 ENCOUNTER — ANESTHESIA (OUTPATIENT)
Facility: HOSPITAL | Age: 59
End: 2024-12-03
Payer: COMMERCIAL

## 2024-12-03 ENCOUNTER — HOSPITAL ENCOUNTER (OUTPATIENT)
Facility: HOSPITAL | Age: 59
Setting detail: OBSERVATION
Discharge: HOME | End: 2024-12-04
Attending: ORTHOPAEDIC SURGERY | Admitting: ORTHOPAEDIC SURGERY
Payer: COMMERCIAL

## 2024-12-03 ENCOUNTER — ANESTHESIA EVENT (OUTPATIENT)
Facility: HOSPITAL | Age: 59
End: 2024-12-03
Payer: COMMERCIAL

## 2024-12-03 DIAGNOSIS — M25.561 PAIN OF RIGHT PATELLOFEMORAL JOINT: Primary | ICD-10-CM

## 2024-12-03 PROBLEM — M17.11 LOCALIZED OSTEOARTHRITIS OF RIGHT KNEE: Status: ACTIVE | Noted: 2024-12-03

## 2024-12-03 LAB
GLUCOSE BLD STRIP.AUTO-MCNC: 267 MG/DL (ref 65–117)
SERVICE CMNT-IMP: ABNORMAL

## 2024-12-03 PROCEDURE — 6360000002 HC RX W HCPCS

## 2024-12-03 PROCEDURE — 6360000002 HC RX W HCPCS: Performed by: ANESTHESIOLOGY

## 2024-12-03 PROCEDURE — 82962 GLUCOSE BLOOD TEST: CPT

## 2024-12-03 PROCEDURE — 2500000003 HC RX 250 WO HCPCS: Performed by: PHYSICIAN ASSISTANT

## 2024-12-03 PROCEDURE — 6360000002 HC RX W HCPCS: Performed by: ORTHOPAEDIC SURGERY

## 2024-12-03 PROCEDURE — 6370000000 HC RX 637 (ALT 250 FOR IP): Performed by: ORTHOPAEDIC SURGERY

## 2024-12-03 PROCEDURE — 6370000000 HC RX 637 (ALT 250 FOR IP): Performed by: ANESTHESIOLOGY

## 2024-12-03 PROCEDURE — 2580000003 HC RX 258: Performed by: PHYSICIAN ASSISTANT

## 2024-12-03 PROCEDURE — G0378 HOSPITAL OBSERVATION PER HR: HCPCS

## 2024-12-03 PROCEDURE — 2720000010 HC SURG SUPPLY STERILE: Performed by: ORTHOPAEDIC SURGERY

## 2024-12-03 PROCEDURE — 2580000003 HC RX 258: Performed by: ANESTHESIOLOGY

## 2024-12-03 PROCEDURE — 27438 REVISE KNEECAP WITH IMPLANT: CPT | Performed by: ORTHOPAEDIC SURGERY

## 2024-12-03 PROCEDURE — 2580000003 HC RX 258

## 2024-12-03 PROCEDURE — 3600000015 HC SURGERY LEVEL 5 ADDTL 15MIN: Performed by: ORTHOPAEDIC SURGERY

## 2024-12-03 PROCEDURE — 6370000000 HC RX 637 (ALT 250 FOR IP): Performed by: PHYSICIAN ASSISTANT

## 2024-12-03 PROCEDURE — C1776 JOINT DEVICE (IMPLANTABLE): HCPCS | Performed by: ORTHOPAEDIC SURGERY

## 2024-12-03 PROCEDURE — C1713 ANCHOR/SCREW BN/BN,TIS/BN: HCPCS | Performed by: ORTHOPAEDIC SURGERY

## 2024-12-03 PROCEDURE — 6360000002 HC RX W HCPCS: Performed by: PHYSICIAN ASSISTANT

## 2024-12-03 PROCEDURE — 7100000000 HC PACU RECOVERY - FIRST 15 MIN: Performed by: ORTHOPAEDIC SURGERY

## 2024-12-03 PROCEDURE — 3600000005 HC SURGERY LEVEL 5 BASE: Performed by: ORTHOPAEDIC SURGERY

## 2024-12-03 PROCEDURE — 2500000003 HC RX 250 WO HCPCS: Performed by: ANESTHESIOLOGY

## 2024-12-03 PROCEDURE — 7100000001 HC PACU RECOVERY - ADDTL 15 MIN: Performed by: ORTHOPAEDIC SURGERY

## 2024-12-03 PROCEDURE — 3700000001 HC ADD 15 MINUTES (ANESTHESIA): Performed by: ORTHOPAEDIC SURGERY

## 2024-12-03 PROCEDURE — 3700000000 HC ANESTHESIA ATTENDED CARE: Performed by: ORTHOPAEDIC SURGERY

## 2024-12-03 PROCEDURE — 2709999900 HC NON-CHARGEABLE SUPPLY: Performed by: ORTHOPAEDIC SURGERY

## 2024-12-03 DEVICE — IMPLANTABLE DEVICE: Type: IMPLANTABLE DEVICE | Site: PATELLA | Status: FUNCTIONAL

## 2024-12-03 DEVICE — CEMENT BNE 40GM FULL DOSE PMMA W/ GENT HI VISC SIMPLEX P 10: Type: IMPLANTABLE DEVICE | Site: PATELLA | Status: FUNCTIONAL

## 2024-12-03 DEVICE — KIT KNEE IMPL CAPPED K1 HEMI STD CEM K1ZIMMERBIOMET: Type: IMPLANTABLE DEVICE | Status: FUNCTIONAL

## 2024-12-03 DEVICE — IMPLANTABLE DEVICE
Type: IMPLANTABLE DEVICE | Site: PATELLA | Status: FUNCTIONAL
Brand: PERSONA® VIVACIT-E®

## 2024-12-03 RX ORDER — ASPIRIN 81 MG/1
81 TABLET ORAL 2 TIMES DAILY
Status: DISCONTINUED | OUTPATIENT
Start: 2024-12-04 | End: 2024-12-04 | Stop reason: HOSPADM

## 2024-12-03 RX ORDER — OXYCODONE HYDROCHLORIDE 5 MG/1
5 TABLET ORAL EVERY 4 HOURS PRN
Status: DISCONTINUED | OUTPATIENT
Start: 2024-12-03 | End: 2024-12-04 | Stop reason: HOSPADM

## 2024-12-03 RX ORDER — SODIUM CHLORIDE 9 MG/ML
INJECTION, SOLUTION INTRAVENOUS PRN
Status: DISCONTINUED | OUTPATIENT
Start: 2024-12-03 | End: 2024-12-03 | Stop reason: HOSPADM

## 2024-12-03 RX ORDER — SODIUM CHLORIDE 9 MG/ML
INJECTION, SOLUTION INTRAVENOUS CONTINUOUS
Status: DISCONTINUED | OUTPATIENT
Start: 2024-12-03 | End: 2024-12-04 | Stop reason: HOSPADM

## 2024-12-03 RX ORDER — OXYCODONE HYDROCHLORIDE 5 MG/1
10 TABLET ORAL PRN
Status: COMPLETED | OUTPATIENT
Start: 2024-12-03 | End: 2024-12-03

## 2024-12-03 RX ORDER — FENTANYL CITRATE 50 UG/ML
100 INJECTION, SOLUTION INTRAMUSCULAR; INTRAVENOUS
Status: DISCONTINUED | OUTPATIENT
Start: 2024-12-03 | End: 2024-12-03 | Stop reason: HOSPADM

## 2024-12-03 RX ORDER — HYDROMORPHONE HYDROCHLORIDE 1 MG/ML
0.5 INJECTION, SOLUTION INTRAMUSCULAR; INTRAVENOUS; SUBCUTANEOUS
Status: DISCONTINUED | OUTPATIENT
Start: 2024-12-03 | End: 2024-12-04 | Stop reason: HOSPADM

## 2024-12-03 RX ORDER — OXYCODONE HYDROCHLORIDE 5 MG/1
5 TABLET ORAL PRN
Status: COMPLETED | OUTPATIENT
Start: 2024-12-03 | End: 2024-12-03

## 2024-12-03 RX ORDER — SODIUM CHLORIDE 0.9 % (FLUSH) 0.9 %
5-40 SYRINGE (ML) INJECTION EVERY 12 HOURS SCHEDULED
Status: DISCONTINUED | OUTPATIENT
Start: 2024-12-03 | End: 2024-12-03 | Stop reason: HOSPADM

## 2024-12-03 RX ORDER — SODIUM CHLORIDE, SODIUM LACTATE, POTASSIUM CHLORIDE, CALCIUM CHLORIDE 600; 310; 30; 20 MG/100ML; MG/100ML; MG/100ML; MG/100ML
INJECTION, SOLUTION INTRAVENOUS CONTINUOUS
Status: DISCONTINUED | OUTPATIENT
Start: 2024-12-03 | End: 2024-12-03 | Stop reason: HOSPADM

## 2024-12-03 RX ORDER — SODIUM CHLORIDE 0.9 % (FLUSH) 0.9 %
5-40 SYRINGE (ML) INJECTION PRN
Status: DISCONTINUED | OUTPATIENT
Start: 2024-12-03 | End: 2024-12-03 | Stop reason: HOSPADM

## 2024-12-03 RX ORDER — ROPIVACAINE HYDROCHLORIDE 5 MG/ML
INJECTION, SOLUTION EPIDURAL; INFILTRATION; PERINEURAL PRN
Status: DISCONTINUED | OUTPATIENT
Start: 2024-12-03 | End: 2024-12-03 | Stop reason: ALTCHOICE

## 2024-12-03 RX ORDER — ONDANSETRON 2 MG/ML
4 INJECTION INTRAMUSCULAR; INTRAVENOUS EVERY 6 HOURS PRN
Status: DISCONTINUED | OUTPATIENT
Start: 2024-12-03 | End: 2024-12-04 | Stop reason: HOSPADM

## 2024-12-03 RX ORDER — PROCHLORPERAZINE EDISYLATE 5 MG/ML
5 INJECTION INTRAMUSCULAR; INTRAVENOUS
Status: DISCONTINUED | OUTPATIENT
Start: 2024-12-03 | End: 2024-12-03 | Stop reason: HOSPADM

## 2024-12-03 RX ORDER — NALOXONE HYDROCHLORIDE 0.4 MG/ML
0.4 INJECTION, SOLUTION INTRAMUSCULAR; INTRAVENOUS; SUBCUTANEOUS PRN
Status: DISCONTINUED | OUTPATIENT
Start: 2024-12-03 | End: 2024-12-04 | Stop reason: HOSPADM

## 2024-12-03 RX ORDER — MIDAZOLAM HYDROCHLORIDE 2 MG/2ML
2 INJECTION, SOLUTION INTRAMUSCULAR; INTRAVENOUS
Status: DISCONTINUED | OUTPATIENT
Start: 2024-12-03 | End: 2024-12-03 | Stop reason: HOSPADM

## 2024-12-03 RX ORDER — HYDRALAZINE HYDROCHLORIDE 20 MG/ML
10 INJECTION INTRAMUSCULAR; INTRAVENOUS
Status: DISCONTINUED | OUTPATIENT
Start: 2024-12-03 | End: 2024-12-03 | Stop reason: HOSPADM

## 2024-12-03 RX ORDER — SODIUM CHLORIDE, SODIUM LACTATE, POTASSIUM CHLORIDE, CALCIUM CHLORIDE 600; 310; 30; 20 MG/100ML; MG/100ML; MG/100ML; MG/100ML
INJECTION, SOLUTION INTRAVENOUS CONTINUOUS
Status: DISCONTINUED | OUTPATIENT
Start: 2024-12-03 | End: 2024-12-03

## 2024-12-03 RX ORDER — MIDAZOLAM HYDROCHLORIDE 5 MG/5ML
2 INJECTION, SOLUTION INTRAMUSCULAR; INTRAVENOUS
Status: DISCONTINUED | OUTPATIENT
Start: 2024-12-03 | End: 2024-12-03 | Stop reason: HOSPADM

## 2024-12-03 RX ORDER — ACETAMINOPHEN 500 MG
1000 TABLET ORAL ONCE
Status: DISCONTINUED | OUTPATIENT
Start: 2024-12-03 | End: 2024-12-03 | Stop reason: HOSPADM

## 2024-12-03 RX ORDER — ONDANSETRON 2 MG/ML
INJECTION INTRAMUSCULAR; INTRAVENOUS
Status: DISCONTINUED | OUTPATIENT
Start: 2024-12-03 | End: 2024-12-03 | Stop reason: SDUPTHER

## 2024-12-03 RX ORDER — TAMSULOSIN HYDROCHLORIDE 0.4 MG/1
0.4 CAPSULE ORAL DAILY
Status: DISCONTINUED | OUTPATIENT
Start: 2024-12-03 | End: 2024-12-04 | Stop reason: HOSPADM

## 2024-12-03 RX ORDER — HYDROXYZINE HYDROCHLORIDE 10 MG/1
10 TABLET, FILM COATED ORAL EVERY 8 HOURS PRN
Status: DISCONTINUED | OUTPATIENT
Start: 2024-12-03 | End: 2024-12-04 | Stop reason: HOSPADM

## 2024-12-03 RX ORDER — NALOXONE HYDROCHLORIDE 0.4 MG/ML
INJECTION, SOLUTION INTRAMUSCULAR; INTRAVENOUS; SUBCUTANEOUS PRN
Status: DISCONTINUED | OUTPATIENT
Start: 2024-12-03 | End: 2024-12-03 | Stop reason: HOSPADM

## 2024-12-03 RX ORDER — SENNA AND DOCUSATE SODIUM 50; 8.6 MG/1; MG/1
1 TABLET, FILM COATED ORAL 2 TIMES DAILY
Status: DISCONTINUED | OUTPATIENT
Start: 2024-12-03 | End: 2024-12-04 | Stop reason: HOSPADM

## 2024-12-03 RX ORDER — DEXAMETHASONE SODIUM PHOSPHATE 4 MG/ML
8 INJECTION, SOLUTION INTRA-ARTICULAR; INTRALESIONAL; INTRAMUSCULAR; INTRAVENOUS; SOFT TISSUE ONCE
Status: COMPLETED | OUTPATIENT
Start: 2024-12-03 | End: 2024-12-03

## 2024-12-03 RX ORDER — BISACODYL 10 MG
10 SUPPOSITORY, RECTAL RECTAL DAILY PRN
Status: DISCONTINUED | OUTPATIENT
Start: 2024-12-03 | End: 2024-12-04 | Stop reason: HOSPADM

## 2024-12-03 RX ORDER — ACETAMINOPHEN 500 MG
1000 TABLET ORAL ONCE
Status: COMPLETED | OUTPATIENT
Start: 2024-12-03 | End: 2024-12-03

## 2024-12-03 RX ORDER — TRAMADOL HYDROCHLORIDE 50 MG/1
50 TABLET ORAL EVERY 6 HOURS PRN
Status: DISCONTINUED | OUTPATIENT
Start: 2024-12-03 | End: 2024-12-04 | Stop reason: HOSPADM

## 2024-12-03 RX ORDER — MEPERIDINE HYDROCHLORIDE 25 MG/ML
12.5 INJECTION INTRAMUSCULAR; INTRAVENOUS; SUBCUTANEOUS EVERY 5 MIN PRN
Status: DISCONTINUED | OUTPATIENT
Start: 2024-12-03 | End: 2024-12-03 | Stop reason: HOSPADM

## 2024-12-03 RX ORDER — DIPHENHYDRAMINE HYDROCHLORIDE 50 MG/ML
12.5 INJECTION INTRAMUSCULAR; INTRAVENOUS
Status: DISCONTINUED | OUTPATIENT
Start: 2024-12-03 | End: 2024-12-03 | Stop reason: HOSPADM

## 2024-12-03 RX ORDER — DEXAMETHASONE SODIUM PHOSPHATE 4 MG/ML
4 INJECTION, SOLUTION INTRA-ARTICULAR; INTRALESIONAL; INTRAMUSCULAR; INTRAVENOUS; SOFT TISSUE
Status: DISCONTINUED | OUTPATIENT
Start: 2024-12-03 | End: 2024-12-03 | Stop reason: HOSPADM

## 2024-12-03 RX ORDER — LIDOCAINE HYDROCHLORIDE 20 MG/ML
INJECTION, SOLUTION EPIDURAL; INFILTRATION; INTRACAUDAL; PERINEURAL
Status: DISCONTINUED | OUTPATIENT
Start: 2024-12-03 | End: 2024-12-03 | Stop reason: SDUPTHER

## 2024-12-03 RX ORDER — FAMOTIDINE 20 MG/1
20 TABLET, FILM COATED ORAL 2 TIMES DAILY
Status: DISCONTINUED | OUTPATIENT
Start: 2024-12-03 | End: 2024-12-04 | Stop reason: HOSPADM

## 2024-12-03 RX ORDER — BUPIVACAINE HYDROCHLORIDE 7.5 MG/ML
INJECTION, SOLUTION INTRASPINAL
Status: COMPLETED | OUTPATIENT
Start: 2024-12-03 | End: 2024-12-03

## 2024-12-03 RX ORDER — ACETAMINOPHEN 325 MG/1
650 TABLET ORAL EVERY 6 HOURS
Status: DISCONTINUED | OUTPATIENT
Start: 2024-12-03 | End: 2024-12-04 | Stop reason: HOSPADM

## 2024-12-03 RX ORDER — POLYETHYLENE GLYCOL 3350 17 G/17G
17 POWDER, FOR SOLUTION ORAL DAILY
Status: DISCONTINUED | OUTPATIENT
Start: 2024-12-03 | End: 2024-12-04 | Stop reason: HOSPADM

## 2024-12-03 RX ORDER — SODIUM CHLORIDE 9 MG/ML
INJECTION, SOLUTION INTRAVENOUS PRN
Status: DISCONTINUED | OUTPATIENT
Start: 2024-12-03 | End: 2024-12-04 | Stop reason: HOSPADM

## 2024-12-03 RX ORDER — SODIUM CHLORIDE 0.9 % (FLUSH) 0.9 %
5-40 SYRINGE (ML) INJECTION PRN
Status: DISCONTINUED | OUTPATIENT
Start: 2024-12-03 | End: 2024-12-04 | Stop reason: HOSPADM

## 2024-12-03 RX ORDER — MIDAZOLAM HYDROCHLORIDE 1 MG/ML
INJECTION, SOLUTION INTRAMUSCULAR; INTRAVENOUS
Status: DISCONTINUED | OUTPATIENT
Start: 2024-12-03 | End: 2024-12-03 | Stop reason: SDUPTHER

## 2024-12-03 RX ORDER — FENTANYL CITRATE 50 UG/ML
25 INJECTION, SOLUTION INTRAMUSCULAR; INTRAVENOUS EVERY 5 MIN PRN
Status: COMPLETED | OUTPATIENT
Start: 2024-12-03 | End: 2024-12-03

## 2024-12-03 RX ORDER — SODIUM CHLORIDE 0.9 % (FLUSH) 0.9 %
5-40 SYRINGE (ML) INJECTION EVERY 12 HOURS SCHEDULED
Status: DISCONTINUED | OUTPATIENT
Start: 2024-12-03 | End: 2024-12-04 | Stop reason: HOSPADM

## 2024-12-03 RX ORDER — ONDANSETRON 2 MG/ML
4 INJECTION INTRAMUSCULAR; INTRAVENOUS ONCE
Status: DISCONTINUED | OUTPATIENT
Start: 2024-12-03 | End: 2024-12-03 | Stop reason: HOSPADM

## 2024-12-03 RX ORDER — 0.9 % SODIUM CHLORIDE 0.9 %
500 INTRAVENOUS SOLUTION INTRAVENOUS PRN
Status: DISCONTINUED | OUTPATIENT
Start: 2024-12-03 | End: 2024-12-04 | Stop reason: HOSPADM

## 2024-12-03 RX ORDER — ONDANSETRON 4 MG/1
4 TABLET, ORALLY DISINTEGRATING ORAL EVERY 8 HOURS PRN
Status: DISCONTINUED | OUTPATIENT
Start: 2024-12-03 | End: 2024-12-04 | Stop reason: HOSPADM

## 2024-12-03 RX ORDER — CELECOXIB 200 MG/1
200 CAPSULE ORAL ONCE
Status: COMPLETED | OUTPATIENT
Start: 2024-12-03 | End: 2024-12-03

## 2024-12-03 RX ORDER — KETOROLAC TROMETHAMINE 30 MG/ML
30 INJECTION, SOLUTION INTRAMUSCULAR; INTRAVENOUS EVERY 6 HOURS
Status: COMPLETED | OUTPATIENT
Start: 2024-12-03 | End: 2024-12-04

## 2024-12-03 RX ORDER — HYDROMORPHONE HYDROCHLORIDE 1 MG/ML
0.5 INJECTION, SOLUTION INTRAMUSCULAR; INTRAVENOUS; SUBCUTANEOUS EVERY 5 MIN PRN
Status: COMPLETED | OUTPATIENT
Start: 2024-12-03 | End: 2024-12-03

## 2024-12-03 RX ADMIN — HYDROMORPHONE HYDROCHLORIDE 0.5 MG: 1 INJECTION, SOLUTION INTRAMUSCULAR; INTRAVENOUS; SUBCUTANEOUS at 13:35

## 2024-12-03 RX ADMIN — ACETAMINOPHEN 650 MG: 325 TABLET ORAL at 18:14

## 2024-12-03 RX ADMIN — TRAMADOL HYDROCHLORIDE 50 MG: 50 TABLET, COATED ORAL at 18:14

## 2024-12-03 RX ADMIN — ACETAMINOPHEN 1000 MG: 500 TABLET ORAL at 08:17

## 2024-12-03 RX ADMIN — KETOROLAC TROMETHAMINE 30 MG: 30 INJECTION, SOLUTION INTRAMUSCULAR at 18:13

## 2024-12-03 RX ADMIN — DEXAMETHASONE SODIUM PHOSPHATE 8 MG: 4 INJECTION, SOLUTION INTRAMUSCULAR; INTRAVENOUS at 09:51

## 2024-12-03 RX ADMIN — CEFAZOLIN 3000 MG: 10 INJECTION, POWDER, FOR SOLUTION INTRAVENOUS at 18:23

## 2024-12-03 RX ADMIN — MIDAZOLAM HYDROCHLORIDE 2 MG: 1 INJECTION, SOLUTION INTRAMUSCULAR; INTRAVENOUS at 08:56

## 2024-12-03 RX ADMIN — OXYCODONE 5 MG: 5 TABLET ORAL at 15:53

## 2024-12-03 RX ADMIN — TRANEXAMIC ACID 1000 MG: 100 INJECTION, SOLUTION INTRAVENOUS at 10:35

## 2024-12-03 RX ADMIN — PHENYLEPHRINE HYDROCHLORIDE 120 MCG: 10 INJECTION INTRAVENOUS at 09:47

## 2024-12-03 RX ADMIN — FENTANYL CITRATE 25 MCG: 50 INJECTION INTRAMUSCULAR; INTRAVENOUS at 13:18

## 2024-12-03 RX ADMIN — TRANEXAMIC ACID 1000 MG: 100 INJECTION, SOLUTION INTRAVENOUS at 09:51

## 2024-12-03 RX ADMIN — ACETAMINOPHEN 650 MG: 325 TABLET ORAL at 23:33

## 2024-12-03 RX ADMIN — FAMOTIDINE 20 MG: 20 TABLET, FILM COATED ORAL at 21:26

## 2024-12-03 RX ADMIN — ONDANSETRON HYDROCHLORIDE 4 MG: 2 INJECTION, SOLUTION INTRAMUSCULAR; INTRAVENOUS at 09:44

## 2024-12-03 RX ADMIN — PROPOFOL 50 MG: 10 INJECTION, EMULSION INTRAVENOUS at 09:47

## 2024-12-03 RX ADMIN — Medication 3000 MG: at 09:48

## 2024-12-03 RX ADMIN — SENNOSIDES AND DOCUSATE SODIUM 1 TABLET: 50; 8.6 TABLET ORAL at 21:26

## 2024-12-03 RX ADMIN — BUPIVACAINE HYDROCHLORIDE IN DEXTROSE 12 MG: 7.5 INJECTION, SOLUTION SUBARACHNOID at 08:59

## 2024-12-03 RX ADMIN — ONDANSETRON 4 MG: 2 INJECTION INTRAMUSCULAR; INTRAVENOUS at 18:13

## 2024-12-03 RX ADMIN — KETOROLAC TROMETHAMINE 30 MG: 30 INJECTION, SOLUTION INTRAMUSCULAR at 23:33

## 2024-12-03 RX ADMIN — OXYCODONE 5 MG: 5 TABLET ORAL at 21:47

## 2024-12-03 RX ADMIN — CELECOXIB 200 MG: 200 CAPSULE ORAL at 08:18

## 2024-12-03 RX ADMIN — SODIUM CHLORIDE, POTASSIUM CHLORIDE, SODIUM LACTATE AND CALCIUM CHLORIDE: 600; 310; 30; 20 INJECTION, SOLUTION INTRAVENOUS at 08:32

## 2024-12-03 RX ADMIN — Medication 3 AMPULE: at 08:18

## 2024-12-03 RX ADMIN — FENTANYL CITRATE 25 MCG: 50 INJECTION INTRAMUSCULAR; INTRAVENOUS at 13:32

## 2024-12-03 RX ADMIN — SODIUM CHLORIDE, PRESERVATIVE FREE 10 ML: 5 INJECTION INTRAVENOUS at 21:26

## 2024-12-03 RX ADMIN — OXYCODONE 10 MG: 5 TABLET ORAL at 12:18

## 2024-12-03 RX ADMIN — PROPOFOL 120 MCG/KG/MIN: 10 INJECTION, EMULSION INTRAVENOUS at 09:48

## 2024-12-03 RX ADMIN — HYDROMORPHONE HYDROCHLORIDE 0.5 MG: 1 INJECTION, SOLUTION INTRAMUSCULAR; INTRAVENOUS; SUBCUTANEOUS at 14:32

## 2024-12-03 RX ADMIN — LIDOCAINE HYDROCHLORIDE 40 MG: 20 INJECTION, SOLUTION EPIDURAL; INFILTRATION; INTRACAUDAL; PERINEURAL at 09:47

## 2024-12-03 ASSESSMENT — PAIN DESCRIPTION - FREQUENCY
FREQUENCY: INTERMITTENT

## 2024-12-03 ASSESSMENT — PAIN DESCRIPTION - LOCATION
LOCATION: KNEE

## 2024-12-03 ASSESSMENT — PAIN DESCRIPTION - PAIN TYPE
TYPE: SURGICAL PAIN

## 2024-12-03 ASSESSMENT — PAIN DESCRIPTION - DESCRIPTORS
DESCRIPTORS: ACHING
DESCRIPTORS: BURNING
DESCRIPTORS: ACHING
DESCRIPTORS: ACHING;DISCOMFORT
DESCRIPTORS: ACHING
DESCRIPTORS: ACHING;DISCOMFORT
DESCRIPTORS: BURNING
DESCRIPTORS: ACHING
DESCRIPTORS: ACHING;DISCOMFORT
DESCRIPTORS: ACHING;THROBBING

## 2024-12-03 ASSESSMENT — PAIN SCALES - GENERAL
PAINLEVEL_OUTOF10: 5
PAINLEVEL_OUTOF10: 7
PAINLEVEL_OUTOF10: 9
PAINLEVEL_OUTOF10: 6
PAINLEVEL_OUTOF10: 3
PAINLEVEL_OUTOF10: 8
PAINLEVEL_OUTOF10: 6

## 2024-12-03 ASSESSMENT — PAIN DESCRIPTION - ORIENTATION
ORIENTATION: RIGHT

## 2024-12-03 ASSESSMENT — PAIN - FUNCTIONAL ASSESSMENT
PAIN_FUNCTIONAL_ASSESSMENT: PREVENTS OR INTERFERES SOME ACTIVE ACTIVITIES AND ADLS
PAIN_FUNCTIONAL_ASSESSMENT: PREVENTS OR INTERFERES SOME ACTIVE ACTIVITIES AND ADLS
PAIN_FUNCTIONAL_ASSESSMENT: 0-10
PAIN_FUNCTIONAL_ASSESSMENT: PREVENTS OR INTERFERES SOME ACTIVE ACTIVITIES AND ADLS

## 2024-12-03 ASSESSMENT — PAIN DESCRIPTION - ONSET
ONSET: GRADUAL

## 2024-12-03 NOTE — PERIOP NOTE
0801 - PT DENIES FEVER, COLD, COUGH, SOB, N/V, DIARRHEA...  PRE-OP TCHING DONE - PT VERBALIZES UNDERSTANDING.   STRETCHER IN LOWEST POSITION, CB IN PLACE AND S RUP X2.    0855 - TIMEOUT DONE - DR. HAND AT BEDSIDE TO PLACE SPINAL.  PT JEAN WELL.  VSS  0913 - DR. HAND AT BEDSIDE TO TREAT BP.

## 2024-12-03 NOTE — H&P
Update History & Physical    The patient's History and Physical  was reviewed with the patient and I examined the patient. There was no change. The surgical site was confirmed by the patient and me.       Plan: The risks, benefits, expected outcome, and alternative to the recommended procedure have been discussed with the patient. Patient understands and wants to proceed with the procedure.     Electronically signed by Mick Blount MD on 12/3/2024 at 7:49 AM

## 2024-12-03 NOTE — ANESTHESIA POSTPROCEDURE EVALUATION
Department of Anesthesiology  Postprocedure Note    Patient: Patrick Hinds  MRN: [id number]  YOB: 1965  Date of evaluation: 12/3/2024    Procedure Summary       Date: 12/03/24 Room / Location: [facility] MAIN OR  / [facility] MAIN OR    Anesthesia Start: 0944 Anesthesia Stop: 1111    Procedure: RIGHT PATELLOFEMORAL JOINT ARTHROPLASTY (Right: Knee) Diagnosis:       Arthritis of right knee      (Arthritis of right knee [M17.11])    Providers: Mick Blount MD Responsible Provider: Flo Monique MD    Anesthesia Type: Regional, MAC, Spinal ASA Status: 3            Anesthesia Type: Regional, MAC, Spinal    Gaurang Phase I: Gaurang Score: 9    Gaurang Phase II:      Anesthesia Post Evaluation    Patient location during evaluation: PACU  Patient participation: complete - patient participated  Level of consciousness: awake  Airway patency: patent  Nausea & Vomiting: no vomiting  Cardiovascular status: hemodynamically stable  Respiratory status: acceptable  Hydration status: euvolemic    No notable events documented.

## 2024-12-03 NOTE — ANESTHESIA PRE PROCEDURE
BMI:   Wt Readings from Last 3 Encounters:   12/03/24 129.5 kg (285 lb 7.9 oz)   11/19/24 129.6 kg (285 lb 11.5 oz)   11/05/24 125.6 kg (277 lb)     Body mass index is 39.82 kg/m².    CBC:   Lab Results   Component Value Date/Time    WBC 5.9 11/19/2024 08:45 AM    RBC 5.83 11/19/2024 08:45 AM    HGB 17.3 11/19/2024 08:45 AM    HCT 51.1 11/19/2024 08:45 AM    MCV 87.7 11/19/2024 08:45 AM    RDW 11.9 11/19/2024 08:45 AM     11/19/2024 08:45 AM       CMP:   Lab Results   Component Value Date/Time     11/19/2024 08:45 AM    K 4.0 11/19/2024 08:45 AM     11/19/2024 08:45 AM    CO2 29 11/19/2024 08:45 AM    BUN 23 11/19/2024 08:45 AM    CREATININE 1.05 11/19/2024 08:45 AM    GFRAA >60 05/09/2020 02:35 PM    AGRATIO 0.9 05/09/2020 02:35 PM    LABGLOM 82 11/19/2024 08:45 AM    LABGLOM >60 05/09/2023 09:14 PM    GLUCOSE 119 11/19/2024 08:45 AM    CALCIUM 9.2 11/19/2024 08:45 AM    BILITOT 0.9 11/19/2024 08:45 AM    ALKPHOS 61 11/19/2024 08:45 AM    ALKPHOS 57 05/09/2020 02:35 PM    AST 28 11/19/2024 08:45 AM    ALT 56 11/19/2024 08:45 AM       POC Tests: No results for input(s): \"POCGLU\", \"POCNA\", \"POCK\", \"POCCL\", \"POCBUN\", \"POCHEMO\", \"POCHCT\" in the last 72 hours.    Coags:   Lab Results   Component Value Date/Time    PROTIME 10.1 11/19/2024 08:45 AM    INR 1.0 11/19/2024 08:45 AM    APTT 28.4 11/19/2024 08:45 AM       HCG (If Applicable): No results found for: \"PREGTESTUR\", \"PREGSERUM\", \"HCG\", \"HCGQUANT\"     ABGs: No results found for: \"PHART\", \"PO2ART\", \"BSW4ULQ\", \"XLA5GFT\", \"BEART\", \"K5NXRZQK\"     Type & Screen (If Applicable):  Lab Results   Component Value Date    ABORH AB POSITIVE 11/19/2024    LABANTI NEG 11/19/2024       Drug/Infectious Status (If Applicable):  No results found for: \"HIV\", \"HEPCAB\"    COVID-19 Screening (If Applicable): No results found for: \"COVID19\"        Anesthesia Evaluation  Patient summary reviewed and Nursing notes reviewed  Airway: Mallampati: III     Neck

## 2024-12-03 NOTE — PROGRESS NOTES
End of Shift Note    Bedside shift change report given to ANGELIKA Leal (oncoming nurse) by Flora Saini LPN (offgoing nurse).  Report included the following information SBAR, OR Summary, Intake/Output, MAR, and Recent Results    Shift worked:  7a-7p     Shift summary and any significant changes:     Pt received from PACU at 1800, pt resting comfortably in bed, tramadol 50mg given, vital signs stable, due to void, pt straight cathed once in PACU, 1200mL out, due to void     Concerns for physician to address:  Barriers for DC     Zone phone for oncoming shift:   [phone]       Activity:     Number times ambulated in hallways past shift: 0  Number of times OOB to chair past shift: 0    Cardiac:   Cardiac Monitoring: No      Cardiac Rhythm: Sinus rhythm    Access:  Current line(s): PIV     Genitourinary:   Urinary Status: Has not voided    Respiratory:   O2 Device: None (Room air)  Chronic home O2 use?: NO  Incentive spirometer at bedside: YES    GI:  Last BM (including prior to admit): 12/02/24  Current diet:  ADULT DIET; Regular  Passing flatus: YES    Pain Management:   Patient states pain is manageable on current regimen: YES    Skin:  Ramy Scale Score: 19  Interventions: Wound Offloading (Prevention Methods): Bed, pressure reduction mattress, Elevate heels, Pillows, Repositioning, Turning    Patient Safety:  Fall Risk:    Fall Risk Interventions  Toilet Every 2 Hours-In Advance of Need: Yes  Hourly Visual Checks: Awake  Fall Visual Posted: Armband  Room Door Open: Yes  Alarm On: Bed  Patient Moved Closer to Nursing Station: No    Active Consults:   IP CONSULT TO CASE MANAGEMENT    Length of Stay:  Expected LOS: 1  Actual LOS: 0    Flora Saini LPN

## 2024-12-03 NOTE — FLOWSHEET NOTE
12/03/24 1445   Handoff   Communication Given Transfer Handoff   Handoff Given To Angelina CARNEY   Handoff Received From Nelda CARNEY   Handoff Communication Telephone     Urinary Retention Nurse Driven Protocol Documentation    Date and time of last void/straight catheterization (Date/Time or unknown)     Most recent bladder scan:  12/3/24    Bladder scan amount represents:  >650    Order placed for straight catheterization per protocol    --------------------------------------------------------------------------------------------------------    Straight catheterization performed per Nurse Driven Protocol for      Actual amount drained at time of insertion (ml) 1200    All amounts documented on Doc flowsheet.    Serina Bryan RN       1802 Updated Angelina CARNEY,  regarding straight cath and PRN pain medication given.

## 2024-12-03 NOTE — OP NOTE
Name: Patrick Hinds  MRN:  [id number]  : 1965  Age:  59 y.o.  Surgery Date: 12/3/2024      OPERATIVE REPORT - RIGHT PATELLOFEMORAL KNEE REPLACEMENT    PREOPERATIVE DIAGNOSIS: Osteoarthritis, right patellofemoral joint    POSTOPERATIVE DIAGNOSIS: Osteoarthritis, right patellofemoral joint    PROCEDURE PERFORMED: Right patellofemoral arthroplasty    SURGEON: Mick Blount MD    FIRST ASSISTANT:  Hernandez    ANESTHESIA: Spinal    PRE-OP ANTIBIOTIC: Ancef 2g    COMPLICATIONS: None.    ESTIMATED BLOOD LOSS: 50 mL.    SPECIMENS REMOVED: None.    COMPONENTS IMPLANTED:   Implant Name Type Inv. Item Serial No.  Lot No. LRB No. Used Action   CEMENT BNE 40GM FULL DOSE PMMA W/ GENT HI VISC SIMPLEX P 10 - SNA  CEMENT BNE 40GM FULL DOSE PMMA W/ GENT HI VISC SIMPLEX P 10 NA [employer] [facility]- [id number] Right 1 Implanted   COMPONENT PAT [id number] THK9.5MM STD VIVACIT-E SHER INSET FOR - [id number]  COMPONENT PAT [id number] THK9.5MM STD VIVACIT-E SHER INSET FOR  [employer]- [id number] Right 1 Implanted   COMPONENT FEM SZ 5 RT KNEE CO CHROM SHER PC GENDER SOL - SNA  COMPONENT FEM SZ 5 RT KNEE CO CHROM SHER PC GENDER SOL NA [employer]WD [id number] Right 1 Implanted            INDICATIONS FOR PROCEDURE: The patient presents with worsening anterior knee pain that has not responded to conservative management. Radiographs were consistent with significant osteoarthritis of the patellofemoral joint involving the patella and trochlea.  We discussed management including continued   physical therapy and conservative treatment versus operative   treatment. The patient was very interested in proceeding with operative   management considering they had failed other conservative measures.   We discussed risks and benefits of patellofemoral arthroplasty as well as the possibility   that this may not completely eliminate their knee pain. They understood   this and wished to proceed.     DESCRIPTION OF    followed by 3-0 Monocryl. A Dermabond, Aquacel and a soft dressing   was placed. The patient was taken to the PACU in stable condition.      Mick Blount MD

## 2024-12-03 NOTE — PROGRESS NOTES
Visited patient in PACU.  Patient given RW from consignment for home use.    Discussed the importance of using pain medication and other methods for pain control such as ice/rest/elevate, pre-medicating prior to physical therapy.  Discussed the importance of being safe at hospital and home by using RW until cleared by PT, wearing safe shoes, preparing home for after surgery and having a  to help at home.  Discussed the importance of home PT, daily exercises as instructed by physical therapist and post-op appointment with surgeon and the need for transportation to this appointment until the surgeon has cleared you for driving.    Discussed risk after surgery and the importance of preventing infection by good hand hygiene, using clean towels and wash cloths at each shower, inspect wound/dressing daily, moving every two hours while awake, using the incentive spirometer every hour while awake.  When to call the doctor for help, or when to call 911 for emergency.    Opportunity given for patient/family to ask additional questions about any special concerns regarding your care while on the Ortho unit and preparing for discharge.

## 2024-12-03 NOTE — PLAN OF CARE
Problem: Pain  Goal: Verbalizes/displays adequate comfort level or baseline comfort level  Outcome: Progressing     Problem: Safety - Adult  Goal: Free from fall injury  Outcome: Progressing  Flowsheets (Taken 12/3/2024 1813)  Free From Fall Injury: Instruct family/caregiver on patient safety     Problem: ABCDS Injury Assessment  Goal: Absence of physical injury  Outcome: Progressing

## 2024-12-03 NOTE — FLOWSHEET NOTE
12/03/24 1110   Handoff   Communication Given Transfer Handoff   Handoff Given To Nelda CARNEY   Handoff Received From FLAKITA Gates RN and ROB Nguyen CRNA   Handoff Communication Face to Face;At bedside

## 2024-12-03 NOTE — ANESTHESIA PROCEDURE NOTES
Spinal Block    Patient location during procedure: pre-op  End time: 12/3/2024 9:59 AM  Reason for block: primary anesthetic  Staffing  Performed: anesthesiologist   Anesthesiologist: Flo Monique MD  Performed by: Flo Monique MD  Authorized by: Flo Monique MD    Spinal Block  Patient position: sitting  Prep: ChloraPrep  Patient monitoring: cardiac monitor, continuous pulse ox, frequent blood pressure checks and oxygen  Approach: midline  Location: L2/L3  Provider prep: sterile gloves and mask  Local infiltration: lidocaine  Needle  Needle type: Jacoby   Needle gauge: 25 G  Assessment  CSF: clear  Attempts: 1  Hemodynamics: stable  Preanesthetic Checklist  Completed: patient identified, IV checked, site marked, risks and benefits discussed, surgical/procedural consents, equipment checked, pre-op evaluation, timeout performed, anesthesia consent given, oxygen available, monitors applied/VS acknowledged, fire risk safety assessment completed and verbalized and blood product R/B/A discussed and consented

## 2024-12-04 VITALS
TEMPERATURE: 97.3 F | HEART RATE: 72 BPM | WEIGHT: 285.5 LBS | BODY MASS INDEX: 39.97 KG/M2 | OXYGEN SATURATION: 95 % | SYSTOLIC BLOOD PRESSURE: 99 MMHG | HEIGHT: 71 IN | DIASTOLIC BLOOD PRESSURE: 57 MMHG | RESPIRATION RATE: 16 BRPM

## 2024-12-04 LAB
ANION GAP SERPL CALC-SCNC: 7 MMOL/L (ref 2–12)
BUN SERPL-MCNC: 22 MG/DL (ref 6–20)
BUN/CREAT SERPL: 21 (ref 12–20)
CALCIUM SERPL-MCNC: 9 MG/DL (ref 8.5–10.1)
CHLORIDE SERPL-SCNC: 103 MMOL/L (ref 97–108)
CO2 SERPL-SCNC: 27 MMOL/L (ref 21–32)
CREAT SERPL-MCNC: 1.06 MG/DL (ref 0.7–1.3)
GLUCOSE BLD STRIP.AUTO-MCNC: 141 MG/DL (ref 65–117)
GLUCOSE SERPL-MCNC: 144 MG/DL (ref 65–100)
HCT VFR BLD AUTO: 46.1 % (ref 36.6–50.3)
HGB BLD-MCNC: 15.6 G/DL (ref 12.1–17)
POTASSIUM SERPL-SCNC: 4.1 MMOL/L (ref 3.5–5.1)
SERVICE CMNT-IMP: ABNORMAL
SODIUM SERPL-SCNC: 137 MMOL/L (ref 136–145)

## 2024-12-04 PROCEDURE — 85014 HEMATOCRIT: CPT

## 2024-12-04 PROCEDURE — 80048 BASIC METABOLIC PNL TOTAL CA: CPT

## 2024-12-04 PROCEDURE — 96374 THER/PROPH/DIAG INJ IV PUSH: CPT

## 2024-12-04 PROCEDURE — 2500000003 HC RX 250 WO HCPCS: Performed by: PHYSICIAN ASSISTANT

## 2024-12-04 PROCEDURE — 96375 TX/PRO/DX INJ NEW DRUG ADDON: CPT

## 2024-12-04 PROCEDURE — 2580000003 HC RX 258: Performed by: PHYSICIAN ASSISTANT

## 2024-12-04 PROCEDURE — 82962 GLUCOSE BLOOD TEST: CPT

## 2024-12-04 PROCEDURE — 36415 COLL VENOUS BLD VENIPUNCTURE: CPT

## 2024-12-04 PROCEDURE — G0378 HOSPITAL OBSERVATION PER HR: HCPCS

## 2024-12-04 PROCEDURE — 97535 SELF CARE MNGMENT TRAINING: CPT

## 2024-12-04 PROCEDURE — 96376 TX/PRO/DX INJ SAME DRUG ADON: CPT

## 2024-12-04 PROCEDURE — 97116 GAIT TRAINING THERAPY: CPT

## 2024-12-04 PROCEDURE — 85018 HEMOGLOBIN: CPT

## 2024-12-04 PROCEDURE — 97530 THERAPEUTIC ACTIVITIES: CPT

## 2024-12-04 PROCEDURE — 6370000000 HC RX 637 (ALT 250 FOR IP): Performed by: PHYSICIAN ASSISTANT

## 2024-12-04 PROCEDURE — 97165 OT EVAL LOW COMPLEX 30 MIN: CPT

## 2024-12-04 PROCEDURE — 97162 PT EVAL MOD COMPLEX 30 MIN: CPT

## 2024-12-04 PROCEDURE — 6360000002 HC RX W HCPCS: Performed by: PHYSICIAN ASSISTANT

## 2024-12-04 RX ORDER — FAMOTIDINE 20 MG/1
20 TABLET, FILM COATED ORAL 2 TIMES DAILY
Qty: 60 TABLET | Refills: 3 | Status: SHIPPED | OUTPATIENT
Start: 2024-12-04

## 2024-12-04 RX ORDER — TRAMADOL HYDROCHLORIDE 50 MG/1
50 TABLET ORAL EVERY 6 HOURS PRN
Qty: 28 TABLET | Refills: 0 | Status: SHIPPED | OUTPATIENT
Start: 2024-12-04 | End: 2024-12-10 | Stop reason: SDUPTHER

## 2024-12-04 RX ORDER — SENNA AND DOCUSATE SODIUM 50; 8.6 MG/1; MG/1
1 TABLET, FILM COATED ORAL 2 TIMES DAILY
Qty: 20 TABLET | Refills: 0 | Status: SHIPPED | OUTPATIENT
Start: 2024-12-04

## 2024-12-04 RX ORDER — ASPIRIN 81 MG/1
81 TABLET ORAL 2 TIMES DAILY
Qty: 30 TABLET | Refills: 3 | Status: SHIPPED | OUTPATIENT
Start: 2024-12-04

## 2024-12-04 RX ADMIN — ACETAMINOPHEN 650 MG: 325 TABLET ORAL at 12:39

## 2024-12-04 RX ADMIN — SENNOSIDES AND DOCUSATE SODIUM 1 TABLET: 50; 8.6 TABLET ORAL at 09:04

## 2024-12-04 RX ADMIN — CEFAZOLIN 3000 MG: 10 INJECTION, POWDER, FOR SOLUTION INTRAVENOUS at 01:37

## 2024-12-04 RX ADMIN — ASPIRIN 81 MG: 81 TABLET, COATED ORAL at 09:04

## 2024-12-04 RX ADMIN — SODIUM CHLORIDE, PRESERVATIVE FREE 10 ML: 5 INJECTION INTRAVENOUS at 09:07

## 2024-12-04 RX ADMIN — HYDROMORPHONE HYDROCHLORIDE 0.5 MG: 1 INJECTION, SOLUTION INTRAMUSCULAR; INTRAVENOUS; SUBCUTANEOUS at 09:11

## 2024-12-04 RX ADMIN — FAMOTIDINE 20 MG: 20 TABLET, FILM COATED ORAL at 09:05

## 2024-12-04 RX ADMIN — ONDANSETRON 4 MG: 2 INJECTION INTRAMUSCULAR; INTRAVENOUS at 00:03

## 2024-12-04 RX ADMIN — OXYCODONE 5 MG: 5 TABLET ORAL at 12:39

## 2024-12-04 RX ADMIN — KETOROLAC TROMETHAMINE 30 MG: 30 INJECTION, SOLUTION INTRAMUSCULAR at 12:39

## 2024-12-04 RX ADMIN — ACETAMINOPHEN 650 MG: 325 TABLET ORAL at 05:13

## 2024-12-04 RX ADMIN — ONDANSETRON 4 MG: 4 TABLET, ORALLY DISINTEGRATING ORAL at 12:41

## 2024-12-04 RX ADMIN — TAMSULOSIN HYDROCHLORIDE 0.4 MG: 0.4 CAPSULE ORAL at 09:04

## 2024-12-04 RX ADMIN — KETOROLAC TROMETHAMINE 30 MG: 30 INJECTION, SOLUTION INTRAMUSCULAR at 05:13

## 2024-12-04 ASSESSMENT — PAIN DESCRIPTION - FREQUENCY: FREQUENCY: INTERMITTENT

## 2024-12-04 ASSESSMENT — PAIN DESCRIPTION - DESCRIPTORS
DESCRIPTORS: ACHING

## 2024-12-04 ASSESSMENT — PAIN SCALES - GENERAL
PAINLEVEL_OUTOF10: 4
PAINLEVEL_OUTOF10: 4
PAINLEVEL_OUTOF10: 9
PAINLEVEL_OUTOF10: 3
PAINLEVEL_OUTOF10: 8

## 2024-12-04 ASSESSMENT — PAIN DESCRIPTION - LOCATION
LOCATION: KNEE

## 2024-12-04 ASSESSMENT — PAIN DESCRIPTION - PAIN TYPE: TYPE: SURGICAL PAIN

## 2024-12-04 ASSESSMENT — PAIN DESCRIPTION - ORIENTATION
ORIENTATION: RIGHT

## 2024-12-04 ASSESSMENT — PAIN DESCRIPTION - ONSET: ONSET: GRADUAL

## 2024-12-04 ASSESSMENT — PAIN - FUNCTIONAL ASSESSMENT: PAIN_FUNCTIONAL_ASSESSMENT: PREVENTS OR INTERFERES SOME ACTIVE ACTIVITIES AND ADLS

## 2024-12-04 NOTE — CARE COORDINATION
CM received consult to arrange home health PT services for pt. Multiple referrals sent as pt's [facility] network is limiting factor for HH agency availability and choice. [facility] has accepted with SOC reported 12/5; will proceed with this agency to ensure timely transition of services. AVS updated.    Celine Hinds, [facility]  Care Management  [phone]

## 2024-12-04 NOTE — PLAN OF CARE
Problem: Safety - Adult  Goal: Free from fall injury  12/4/2024 0633 by Elvis Hawkins RN  Outcome: Progressing  12/3/2024 1835 by Flora Saini LPN  Outcome: Progressing  Flowsheets (Taken 12/3/2024 1813)  Free From Fall Injury: Instruct family/caregiver on patient safety     Problem: ABCDS Injury Assessment  Goal: Absence of physical injury  12/4/2024 0633 by Elvis Hawkins RN  Outcome: Progressing  12/3/2024 1835 by Flora Saini LPN  Outcome: Progressing

## 2024-12-04 NOTE — PROGRESS NOTES
OCCUPATIONAL THERAPY EVALUATION/DISCHARGE  Patient: Patrick Hinds (59 y.o. male)  Date: 12/4/2024  Primary Diagnosis: Arthritis of right knee [M17.11]  Localized osteoarthritis of right knee [M17.11]  Pain of right patellofemoral joint [M25.561]  Procedure(s) (LRB):  RIGHT PATELLOFEMORAL JOINT ARTHROPLASTY (Right) 1 Day Post-Op     Precautions: Weight Bearing Right Lower Extremity Weight Bearing: Weight Bearing As Tolerated                ASSESSMENT :  Based on the objective data below, the patient is functioning slightly below his baseline for ADLs and functional mobility. Patient s/p R patellofemoral joint arthroplasty POD 1 with c/o 5/10 pain. Patient received sitting in bedside chair with wife present in room and cleared for therapy by nursing. Education provided on compensatory techniques for LB dressing with patient requiring up to min assist to don shoe on R foot with wife reporting she is able to assist as needed once home. He completed UB dressing without assist while seated and tolerated well. Education provided on home safety and energy conservation. Patient walked into bathroom using rolling walker and demonstrated toilet transfer/simulated rear pericare. He completed grooming while standing at sink with education provided on squaring off walker at surfaces. Patient was left sitting with all needs met, chair alarmed and wife present in room.     Functional Outcome Measure:  The patient scored 23/24 on the [facility] outcome measure which is indicative of patient is functioning slightly below his baseline for ADLs.      Further skilled acute occupational therapy is not indicated at this time.     PLAN :    Recommendation for discharge: (in order for the patient to meet his/her long term goals):   No skilled occupational therapy    Other factors to consider for discharge: no additional factors    IF patient discharges home will need the following DME: patient owns DME required for discharge     SUBJECTIVE:

## 2024-12-04 NOTE — PROGRESS NOTES
Patient given RW from consignment for home use.  Patient states cannot find RW, RW located and returned to patient.  Spouse at bedside.    Discussed the importance of using pain medication and other methods for pain control such as ice/rest/elevate, pre-medicating prior to physical therapy.  Discussed the importance of being safe at hospital and home by using RW until cleared by PT, wearing safe shoes, preparing home for after surgery and having a  to help at home.  Discussed the importance of home PT, daily exercises as instructed by physical therapist and post-op appointment with surgeon and the need for transportation to this appointment until the surgeon has cleared you for driving.    Discussed risk after surgery and the importance of preventing infection by good hand hygiene, using clean towels and wash cloths at each shower, inspect wound/dressing daily, moving every two hours while awake, using the incentive spirometer every hour while awake.  When to call the doctor for help, or when to call 911 for emergency.    Opportunity given for patient/family to ask additional questions about any special concerns regarding your care while on the Ortho unit and preparing for discharge.

## 2024-12-04 NOTE — PLAN OF CARE
Problem: Physical Therapy - Adult  Goal: By Discharge: Performs mobility at highest level of function for planned discharge setting.  See evaluation for individualized goals.  Description: FUNCTIONAL STATUS PRIOR TO ADMISSION: Patient was independent and active without use of DME.    HOME SUPPORT PRIOR TO ADMISSION: The patient lived with spouse but did not require assistance.    Physical Therapy Goals  Initiated 12/4/2024  1.  Patient will move from supine to sit and sit to supine, scoot up and down, and roll side to side in bed with modified independence within 4 day(s).    2.  Patient will perform sit to stand with modified independence within 4 day(s).  3.  Patient will transfer from bed to chair and chair to bed with modified independence using the least restrictive device within 4 day(s).  4.  Patient will ambulate with modified independence for 400 feet with the least restrictive device within 4 day(s).   5.  Patient will ascend/descend 5 stairs with handrail(s) with independence within 4 day(s).  6. Patient will perform home exercise program per protocol with independence within 4 days.  7. Patient will demonstrate AROM 0-90 degrees in operative joint within 4 days.      Outcome: Progressing   PHYSICAL THERAPY EVALUATION    Patient: Patrick Hinds (59 y.o. male)  Date: 12/4/2024  Primary Diagnosis: Arthritis of right knee [M17.11]  Localized osteoarthritis of right knee [M17.11]  Pain of right patellofemoral joint [M25.561]  Procedure(s) (LRB):  RIGHT PATELLOFEMORAL JOINT ARTHROPLASTY (Right) 1 Day Post-Op   Precautions: Weight Bearing Right Lower Extremity Weight Bearing: Weight Bearing As Tolerated                  ASSESSMENT :   DEFICITS/IMPAIRMENTS:   The patient is limited by decreased functional mobility, independence in ADLs, high-level IADLs, ROM, strength, body mechanics, activity tolerance, endurance, coordination, balance, increased pain levels.  Based on the objective data below, the patient is  help is needed moving from lying on your back to sitting on the side of a flat bed without using bedrails?: None  How much help is needed moving to and from a bed to a chair?: None  How much help is needed standing up from a chair using your arms?: None  How much help is needed walking in hospital room?: None  How much help is needed climbing 3-5 steps with a railing?: A Little    AM-PAC Inpatient Mobility Raw Score : 23  AM-PAC Inpatient T-Scale Score : 56.93     Cutoff score <=171,2,3 had higher odds of discharging home with home health or need of SNF/IPR.    1. Isidra Coleman, Rabia Hyman, Levon Briggs, Giana Brown, Jose Holman, Edwardo Coleman.  Validity of the AM-PAC “6-Clicks” Inpatient Daily Activity and Basic Mobility Short Forms. Physical Therapy Mar 2014, [phone]) [phone]; DOI: 10.2522/ptj.59011596  2. Kiran HERNANDEZ, Aisha J, Gretchen J, Favian ENGLAND. Association of AM-PAC \"6-Clicks\" Basic Mobility and Daily Activity Scores With Discharge Destination. Phys Ther. 2021 Apr 4;101(4):ujsv667. doi: 10.1093/ptj/skud096. PMID: 86109800.  3. Emeterio ENGLAND, Ganesh D, Gosia S, Tressa K, Boubacar S. Activity Measure for Post-Acute Care \"6-Clicks\" Basic Mobility Scores Predict Discharge Destination After Acute Care Hospitalization in Select Patient Groups: A Retrospective, Observational Study. Arch Rehabil Res Clin Transl. 2022 Jul 16;4(3):718124. doi: 10.1016/j.arrct.2022.873494. PMID: 24282560; PMCID: JDX8175859.  4. Jared FUENTES, Marilu S, Ilya W, Katie P. AM-PAC Short Forms Manual 4.0. Revised 2/2020.                                                                                                                                                                                                                               Pain Rating:  Did not interfere    Activity Tolerance:   Fair  and requires frequent rest breaks    After treatment:   Patient left in no apparent distress sitting up in chair, Call bell within

## 2024-12-04 NOTE — DISCHARGE SUMMARY
Ortho Discharge Summary    Patient ID:  Patrick Hinds  [id number]  male  59 y.o.  1965    Admit date: 12/3/2024    Discharge date: 12/4/2024    Admitting Physician: Mick Blount MD     Consulting Physician(s):   Treatment Team:   Mick Blount MD Mudrick, Colin A, MD Gibbs, Jessica, RN  Aretha Herrera, Francisca Rollins, PT    Date of Surgery:   12/3/2024     Preoperative Diagnosis:  Arthritis of right knee [M17.11]    Postoperative Diagnosis:   * No post-op diagnosis entered *    Procedure(s):   RIGHT PATELLOFEMORAL JOINT ARTHROPLASTY     Anesthesia Type:   Spinal     Surgeon: Mick Blount MD                            HPI:  Pt is a 59 y.o. male who has a history of Arthritis of right knee [M17.11]  with pain and limitations of activities of daily living who presents at this time for a right RIGHT PATELLOFEMORAL JOINT ARTHROPLASTY following the failure of conservative management.    PMH:   Past Medical History:   Diagnosis Date    ADD (attention deficit disorder)     Arthritis     Bipolar 2 disorder (HCC)     Fatty liver     as of 5/27/15; pt states he is unaware he had fatty    Sciatica        Body mass index is 39.82 kg/m². : A BMI > 30 is classified as obesity and > 40 is classified as morbid obesity.     Medications upon admission :   Prior to Admission Medications   Prescriptions Last Dose Informant Patient Reported? Taking?   amphetamine-dextroamphetamine (ADDERALL) 20 MG tablet 12/2/2024  Yes Yes   Sig: Take by mouth 3 times daily.   clonazePAM (KLONOPIN) 0.5 MG tablet 12/2/2024  Yes Yes   Sig: Take 2 tablets by mouth 3 times daily as needed for Anxiety.   diclofenac (VOLTAREN) 75 MG EC tablet 11/29/2024  No No   Sig: Take 1 tablet by mouth 2 times daily   ondansetron (ZOFRAN) 4 MG tablet Unknown  No No   Sig: Take 1 tablet by mouth every 8 hours as needed for Nausea or Vomiting   tadalafil (CIALIS) 5 MG tablet 12/1/2024  Yes No   Sig: Take 1 tablet by mouth daily   tamsulosin (FLOMAX) 0.4 MG  capsule 12/2/2024  Yes Yes   Sig: Take 1 capsule by mouth daily      Facility-Administered Medications: None        Allergies:    Allergies   Allergen Reactions    Penicillins Hives        Hospital Course:  The patient underwent surgery.  Complications:  None; patient tolerated the procedure well. Was taken to the PACU in stable condition and then transferred to the ortho floor.      Perioperative Antibiotics:  Ancef     Postoperative Pain Management:  Tramadol    DVT Prophylaxis:   Aspirin 81mg BID     Postoperative transfusions:    Number of units banked PRBCs =   none     Post Op complications: none    Hemoglobin at discharge:    Lab Results   Component Value Date/Time    HGB 15.6 12/04/2024 05:53 AM    INR 1.0 11/19/2024 08:45 AM      Incision - clean, dry and intact. No significant erythema or swelling. Wound appears to be healing without any evidence of infection. Neurovascular exam found to be within normal limits.     Physical Therapy started following surgery and participated in bed mobility, transfers and ambulation.            Discharged to: Home.    Condition on Discharge:   Good    Discharge instructions:  - Anticoagulate with Aspirin   - Take pain medications as prescribed  - Resume pre hospital diet      - Discharge activity: activity as tolerated  - Ambulate with assistive device as needed.  - Weight bearing status WBAT  - Wound Care Keep wound clean and dry.  See discharge instruction sheet.            -DISCHARGE MEDICATION LIST        Medication List        START taking these medications      aspirin 81 MG EC tablet  Take 1 tablet by mouth in the morning and at bedtime     famotidine 20 MG tablet  Commonly known as: PEPCID  Take 1 tablet by mouth 2 times daily     sennosides-docusate sodium 8.6-50 MG tablet  Commonly known as: SENOKOT-S  Take 1 tablet by mouth 2 times daily     traMADol 50 MG tablet  Commonly known as: ULTRAM  Take 1 tablet by mouth every 6 hours as needed for Pain for up to 7

## 2024-12-04 NOTE — PROGRESS NOTES
Ortho / Neurosurgery Progress Note    POD# 1  s/p RIGHT PATELLOFEMORAL JOINT ARTHROPLASTY   Pt seen with no visitor at bedside. He reports he is doing well, reports pain is pain 3/10. Denies fever, chills, nausea, vomiting. Sob, chest or abdominal pain.   Pt cleared PT and is ready for discharge this morning.    Patient in bed    VSS Afebrile.    Visit Vitals  BP (!) 99/57   Pulse 72   Temp 97.3 °F (36.3 °C)   Resp 16   Ht 1.803 m (5' 11\")   Wt 129.5 kg (285 lb 7.9 oz)   SpO2 95%   BMI 39.82 kg/m²       Voiding status: Voiding independently.              Labs    Lab Results   Component Value Date/Time    HGB 15.6 12/04/2024 05:53 AM      Lab Results   Component Value Date/Time    INR 1.0 11/19/2024 08:45 AM      Lab Results   Component Value Date/Time     12/04/2024 05:53 AM    K 4.1 12/04/2024 05:53 AM     12/04/2024 05:53 AM    CO2 27 12/04/2024 05:53 AM    BUN 22 12/04/2024 05:53 AM     Recent Glucose Results:   Glucose   Date Value Ref Range Status   12/04/2024 144 (H) 65 - 100 mg/dL Final   11/19/2024 119 (H) 65 - 100 mg/dL Final   05/09/2023 116 (H) 65 - 100 mg/dL Final           Body mass index is 39.82 kg/m². : A BMI > 30 is classified as obesity and > 40 is classified as morbid obesity.     Awake and alert. No acute distress.    Dressing: Silver Dressing C.D.I.   No significant erythema or swelling  Cryotherapy in place over incision.   BLE sensation to light touch intact  BLE motor intact. Strength 5/5    SCD for mechanical DVT proph while in bed        PLAN:  1) PT BID - WBAT.   2) DVT Prophylaxis: Aspirin 81 mg BID   3) GI Prophylaxis - peocid  4) Pain control - scheduled tylenol  and toradol, and prn  tramadol    5) Readiness for discharge:     [x] Vital Signs stable    [x] Labs stable    [x] + Voiding    [x] Wound intact, drainage minimal    [x] Tolerating PO intake     [x] Cleared by PT (OT if applicable) for discharge   [x] Adequate pain control on oral medication alone        Discharge  Plan: Home with Home Health     Discharge Needs: JOSH Bone [facility]-C, [facility]    Orthopedic Physician Assistant

## 2024-12-04 NOTE — PLAN OF CARE
Predictive Model Details          25 (Normal)  Factor Value    Calculated 12/4/2024 07:59 40% Age 59 years old    Deterioration Index Model 38% Neurological exam X     9% Systolic 102     4% BUN abnormal (22 MG/DL)     4% Sodium 137 mmol/L     2% Pulse oximetry 99 %     2% Potassium 4.1 mmol/L     1% Hematocrit 46.1 %     0% Temperature 97.7 °F (36.5 °C)     0% Pulse 78     0% Respiratory rate 16        Problem: Pain  Goal: Verbalizes/displays adequate comfort level or baseline comfort level  12/4/2024 0759 by Meera Miller RN  Outcome: Progressing  12/3/2024 1835 by Flora Saini LPN  Outcome: Progressing     Problem: Safety - Adult  Goal: Free from fall injury  12/4/2024 0759 by Meera Miller RN  Outcome: Progressing  12/4/2024 0633 by Elvis Hawkins RN  Outcome: Progressing  12/3/2024 1835 by Flora Saini LPN  Outcome: Progressing  Flowsheets (Taken 12/3/2024 1813)  Free From Fall Injury: Instruct family/caregiver on patient safety     Problem: ABCDS Injury Assessment  Goal: Absence of physical injury  12/4/2024 0759 by Meera Miller RN  Outcome: Progressing  12/4/2024 0633 by Elvis Hawkins RN  Outcome: Progressing  12/3/2024 1835 by Flora Saini LPN  Outcome: Progressing

## 2024-12-09 ENCOUNTER — HOSPITAL ENCOUNTER (OUTPATIENT)
Facility: HOSPITAL | Age: 59
Discharge: HOME | End: 2024-12-11
Attending: ORTHOPAEDIC SURGERY
Payer: COMMERCIAL

## 2024-12-09 DIAGNOSIS — Z98.890 STATUS POST KNEE SURGERY: ICD-10-CM

## 2024-12-09 PROCEDURE — 93971 EXTREMITY STUDY: CPT

## 2025-05-08 ENCOUNTER — TRANSCRIBE ORDERS (OUTPATIENT)
Facility: HOSPITAL | Age: 60
End: 2025-05-08

## 2025-05-08 DIAGNOSIS — R22.2 CHEST WALL MASS: Primary | ICD-10-CM

## 2025-05-21 ENCOUNTER — HOSPITAL ENCOUNTER (OUTPATIENT)
Facility: HOSPITAL | Age: 60
Discharge: HOME | End: 2025-05-24
Payer: COMMERCIAL

## 2025-05-21 DIAGNOSIS — R22.2 CHEST WALL MASS: ICD-10-CM

## 2025-05-21 PROCEDURE — 76604 US EXAM CHEST: CPT

## (undated) DEVICE — APPLICATOR MEDICATED 26 CC SOLUTION HI LT ORNG CHLORAPREP

## (undated) DEVICE — RECIPROCATING BLADE HEAVY DUTY, OFFSET  (77.5 X 1.23 X 11.0MM)

## (undated) DEVICE — Device

## (undated) DEVICE — TAPE,CLOTH/SILK,CURAD,3"X10YD,LF,40/CS: Brand: CURAD

## (undated) DEVICE — HANDPIECE SET WITH BONE CLEANING TIP AND SUCTION TUBE: Brand: INTERPULSE

## (undated) DEVICE — TRANSFER SET 3": Brand: MEDLINE INDUSTRIES, INC.

## (undated) DEVICE — SOLUTION IRRIG 1000ML STRL H2O USP PLAS POUR BTL

## (undated) DEVICE — SOLUTION ANTISEP 70% ISO ALC RUBBING 4 OZ

## (undated) DEVICE — LIQUIBAND RAPID ADHESIVE 36/CS 0.8ML: Brand: MEDLINE

## (undated) DEVICE — ROCKER SWITCH PENCIL BLADE ELECTRODE, HOLSTER: Brand: EDGE

## (undated) DEVICE — SUTURE ABSRB L30CM 2-0 VLT SPRL PDS + STRATAFIX SXPP1B410

## (undated) DEVICE — STERILE POLYISOPRENE POWDER-FREE SURGICAL GLOVES: Brand: PROTEXIS

## (undated) DEVICE — 2108 SERIES SAGITTAL BLADE AGGRESSIVE  (25.0 X 1.32 X 86.5MM)

## (undated) DEVICE — SUTURE STRATAFIX SYMMETRIC SZ 1 L18IN ABSRB VLT CT1 L36CM SXPP1A404

## (undated) DEVICE — TUBING SUCT 12FR MAL ALUM SHFT FN CAP VENT UNIV CONN W/ OBT

## (undated) DEVICE — SPONGE GZ W4XL4IN COT 12 PLY TYP VII WVN C FLD DSGN

## (undated) DEVICE — SURGICAL PROCEDURE PACK BASIN MAJ SET CUST NO CAUT

## (undated) DEVICE — SYR 10ML LUER LOK 1/5ML GRAD --

## (undated) DEVICE — SPONGE GZ W4XL4IN COT 12 PLY TYP VII WVN C FLD DSGN STERILE

## (undated) DEVICE — TOTAL JOINT-MRMC: Brand: MEDLINE INDUSTRIES, INC.

## (undated) DEVICE — SOLUTION SCRB 2OZ 10% POVIDONE IOD ANTIMIC BTL

## (undated) DEVICE — SUTURE MONOCRYL STRATAFIX SPRL + SZ 3-0 L12IN ABSRB UD PS-2 SXMP1B106

## (undated) DEVICE — SUT CHRMC 3-0 27IN SH BRN --

## (undated) DEVICE — SYRINGE MED 30ML STD CLR PLAS LUERLOCK TIP N CTRL DISP

## (undated) DEVICE — GLOVE SURG SZ 8 L12IN FNGR THK94MIL STD WHT LTX FREE

## (undated) DEVICE — MARKER,SKIN,WI/RULER AND LABELS: Brand: MEDLINE

## (undated) DEVICE — COVER,MAYO STAND,STERILE: Brand: MEDLINE

## (undated) DEVICE — GLOVE ORTHO 8   MSG9480

## (undated) DEVICE — SUTURE VCRL SZ 2-0 L27IN ABSRB UD L26MM SH 1/2 CIR J417H

## (undated) DEVICE — COVER LT HNDL PLAS RIG 1 PER PK

## (undated) DEVICE — REM POLYHESIVE ADULT PATIENT RETURN ELECTRODE: Brand: VALLEYLAB

## (undated) DEVICE — STRAP,POSITIONING,KNEE/BODY,FOAM,4X60": Brand: MEDLINE

## (undated) DEVICE — DRAPE,EXTREMITY,89X128,STERILE: Brand: MEDLINE

## (undated) DEVICE — TOWEL SURG W17XL27IN STD BLU COT NONFENESTRATED PREWASHED

## (undated) DEVICE — HYPODERMIC SAFETY NEEDLE: Brand: MAGELLAN

## (undated) DEVICE — NEEDLE HYPO 25GA L1.5IN BVL ORIENTED ECLIPSE

## (undated) DEVICE — SOLUTION SURG PREP 26 CC PURPREP

## (undated) DEVICE — SOLUTION IRRIG 3000ML 0.9% SOD CHL USP UROMATIC PLAS CONT

## (undated) DEVICE — 3M™ IOBAN™ 2 ANTIMICROBIAL INCISE DRAPE 6651EZ: Brand: IOBAN™ 2

## (undated) DEVICE — GARMENT,MEDLINE,DVT,INT,CALF,MED, GEN2: Brand: MEDLINE

## (undated) DEVICE — DBD-PACK,LAPAROTOMY,2 REINFORCED GOWNS: Brand: MEDLINE

## (undated) DEVICE — YANKAUER,POOLE TIP,STERILE,50/CS: Brand: MEDLINE

## (undated) DEVICE — BANDAGE COMPR M W6INXL10YD WHT BGE VELC E MTRX HK AND LOOP

## (undated) DEVICE — SUTURE VICRYL 1 L27IN ABSRB CT BRAID COAT UD J281H

## (undated) DEVICE — HOOD, PEEL-AWAY: Brand: FLYTE

## (undated) DEVICE — NEEDLE HYPO 22GA L1.5IN BLK S STL HUB POLYPR SHLD REG BVL

## (undated) DEVICE — JELLY,LUBE,STERILE,FLIP TOP,TUBE,4-OZ: Brand: MEDLINE

## (undated) DEVICE — INFECTION CONTROL KIT SYS